# Patient Record
Sex: MALE | Race: WHITE | Employment: FULL TIME | URBAN - NONMETROPOLITAN AREA
[De-identification: names, ages, dates, MRNs, and addresses within clinical notes are randomized per-mention and may not be internally consistent; named-entity substitution may affect disease eponyms.]

---

## 2020-07-01 ENCOUNTER — APPOINTMENT (OUTPATIENT)
Dept: GENERAL RADIOLOGY | Age: 25
End: 2020-07-01
Payer: MEDICAID

## 2020-07-01 ENCOUNTER — HOSPITAL ENCOUNTER (EMERGENCY)
Age: 25
Discharge: HOME | End: 2020-07-02
Attending: EMERGENCY MEDICINE
Payer: MEDICAID

## 2020-07-01 ENCOUNTER — APPOINTMENT (OUTPATIENT)
Dept: CT IMAGING | Age: 25
End: 2020-07-01
Payer: MEDICAID

## 2020-07-01 LAB
ALBUMIN SERPL-MCNC: 4.2 G/DL (ref 3.5–5.1)
ALP BLD-CCNC: 59 U/L (ref 38–126)
ALT SERPL-CCNC: 25 U/L (ref 11–66)
ANION GAP SERPL CALCULATED.3IONS-SCNC: 15 MEQ/L (ref 8–16)
AST SERPL-CCNC: 15 U/L (ref 5–40)
BASOPHILS # BLD: 0.3 %
BASOPHILS ABSOLUTE: 0 THOU/MM3 (ref 0–0.1)
BILIRUB SERPL-MCNC: 0.5 MG/DL (ref 0.3–1.2)
BUN BLDV-MCNC: 9 MG/DL (ref 7–22)
CALCIUM SERPL-MCNC: 9.2 MG/DL (ref 8.5–10.5)
CHLORIDE BLD-SCNC: 102 MEQ/L (ref 98–111)
CO2: 24 MEQ/L (ref 23–33)
CREAT SERPL-MCNC: 0.8 MG/DL (ref 0.4–1.2)
EKG ATRIAL RATE: 100 BPM
EKG P AXIS: 59 DEGREES
EKG P-R INTERVAL: 144 MS
EKG Q-T INTERVAL: 332 MS
EKG QRS DURATION: 80 MS
EKG QTC CALCULATION (BAZETT): 428 MS
EKG R AXIS: 46 DEGREES
EKG T AXIS: 41 DEGREES
EKG VENTRICULAR RATE: 100 BPM
EOSINOPHIL # BLD: 1.6 %
EOSINOPHILS ABSOLUTE: 0.2 THOU/MM3 (ref 0–0.4)
ERYTHROCYTE [DISTWIDTH] IN BLOOD BY AUTOMATED COUNT: 12.7 % (ref 11.5–14.5)
ERYTHROCYTE [DISTWIDTH] IN BLOOD BY AUTOMATED COUNT: 41.7 FL (ref 35–45)
GFR SERPL CREATININE-BSD FRML MDRD: > 90 ML/MIN/1.73M2
GLUCOSE BLD-MCNC: 98 MG/DL (ref 70–108)
HCT VFR BLD CALC: 48.1 % (ref 42–52)
HEMOGLOBIN: 15.8 GM/DL (ref 14–18)
IMMATURE GRANS (ABS): 0.03 THOU/MM3 (ref 0–0.07)
IMMATURE GRANULOCYTES: 0.3 %
LIPASE: 21 U/L (ref 5.6–51.3)
LYMPHOCYTES # BLD: 16.4 %
LYMPHOCYTES ABSOLUTE: 1.9 THOU/MM3 (ref 1–4.8)
MCH RBC QN AUTO: 29.5 PG (ref 26–33)
MCHC RBC AUTO-ENTMCNC: 32.8 GM/DL (ref 32.2–35.5)
MCV RBC AUTO: 89.7 FL (ref 80–94)
MONOCYTES # BLD: 8.4 %
MONOCYTES ABSOLUTE: 1 THOU/MM3 (ref 0.4–1.3)
NUCLEATED RED BLOOD CELLS: 0 /100 WBC
OSMOLALITY CALCULATION: 279.9 MOSMOL/KG (ref 275–300)
PLATELET # BLD: 351 THOU/MM3 (ref 130–400)
PMV BLD AUTO: 9 FL (ref 9.4–12.4)
POTASSIUM SERPL-SCNC: 4.1 MEQ/L (ref 3.5–5.2)
RBC # BLD: 5.36 MILL/MM3 (ref 4.7–6.1)
SEG NEUTROPHILS: 73 %
SEGMENTED NEUTROPHILS ABSOLUTE COUNT: 8.4 THOU/MM3 (ref 1.8–7.7)
SODIUM BLD-SCNC: 141 MEQ/L (ref 135–145)
TOTAL PROTEIN: 7.7 G/DL (ref 6.1–8)
WBC # BLD: 11.5 THOU/MM3 (ref 4.8–10.8)

## 2020-07-01 PROCEDURE — 96375 TX/PRO/DX INJ NEW DRUG ADDON: CPT

## 2020-07-01 PROCEDURE — 74018 RADEX ABDOMEN 1 VIEW: CPT

## 2020-07-01 PROCEDURE — 74177 CT ABD & PELVIS W/CONTRAST: CPT

## 2020-07-01 PROCEDURE — 99284 EMERGENCY DEPT VISIT MOD MDM: CPT

## 2020-07-01 PROCEDURE — 93005 ELECTROCARDIOGRAM TRACING: CPT | Performed by: EMERGENCY MEDICINE

## 2020-07-01 PROCEDURE — 6370000000 HC RX 637 (ALT 250 FOR IP): Performed by: EMERGENCY MEDICINE

## 2020-07-01 PROCEDURE — 6360000002 HC RX W HCPCS: Performed by: EMERGENCY MEDICINE

## 2020-07-01 PROCEDURE — 36415 COLL VENOUS BLD VENIPUNCTURE: CPT

## 2020-07-01 PROCEDURE — 85025 COMPLETE CBC W/AUTO DIFF WBC: CPT

## 2020-07-01 PROCEDURE — 96374 THER/PROPH/DIAG INJ IV PUSH: CPT

## 2020-07-01 PROCEDURE — 80053 COMPREHEN METABOLIC PANEL: CPT

## 2020-07-01 PROCEDURE — 6360000004 HC RX CONTRAST MEDICATION: Performed by: EMERGENCY MEDICINE

## 2020-07-01 PROCEDURE — 83690 ASSAY OF LIPASE: CPT

## 2020-07-01 PROCEDURE — 2580000003 HC RX 258: Performed by: EMERGENCY MEDICINE

## 2020-07-01 RX ORDER — 0.9 % SODIUM CHLORIDE 0.9 %
1000 INTRAVENOUS SOLUTION INTRAVENOUS ONCE
Status: COMPLETED | OUTPATIENT
Start: 2020-07-01 | End: 2020-07-02

## 2020-07-01 RX ORDER — METOCLOPRAMIDE HYDROCHLORIDE 5 MG/ML
10 INJECTION INTRAMUSCULAR; INTRAVENOUS ONCE
Status: COMPLETED | OUTPATIENT
Start: 2020-07-01 | End: 2020-07-01

## 2020-07-01 RX ORDER — DICYCLOMINE HYDROCHLORIDE 10 MG/1
20 CAPSULE ORAL ONCE
Status: COMPLETED | OUTPATIENT
Start: 2020-07-01 | End: 2020-07-01

## 2020-07-01 RX ORDER — KETOROLAC TROMETHAMINE 30 MG/ML
30 INJECTION, SOLUTION INTRAMUSCULAR; INTRAVENOUS ONCE
Status: COMPLETED | OUTPATIENT
Start: 2020-07-01 | End: 2020-07-01

## 2020-07-01 RX ADMIN — KETOROLAC TROMETHAMINE 30 MG: 30 INJECTION, SOLUTION INTRAMUSCULAR at 23:05

## 2020-07-01 RX ADMIN — SODIUM CHLORIDE 1000 ML: 9 INJECTION, SOLUTION INTRAVENOUS at 23:07

## 2020-07-01 RX ADMIN — DICYCLOMINE HYDROCHLORIDE 20 MG: 10 CAPSULE ORAL at 23:04

## 2020-07-01 RX ADMIN — METOCLOPRAMIDE 10 MG: 5 INJECTION, SOLUTION INTRAMUSCULAR; INTRAVENOUS at 23:05

## 2020-07-01 ASSESSMENT — PAIN SCALES - GENERAL: PAINLEVEL_OUTOF10: 10

## 2020-07-02 VITALS
WEIGHT: 300 LBS | HEART RATE: 64 BPM | DIASTOLIC BLOOD PRESSURE: 61 MMHG | SYSTOLIC BLOOD PRESSURE: 105 MMHG | BODY MASS INDEX: 42.95 KG/M2 | HEIGHT: 70 IN | OXYGEN SATURATION: 99 % | TEMPERATURE: 97.7 F | RESPIRATION RATE: 20 BRPM

## 2020-07-02 PROCEDURE — 6360000004 HC RX CONTRAST MEDICATION: Performed by: EMERGENCY MEDICINE

## 2020-07-02 PROCEDURE — 93010 ELECTROCARDIOGRAM REPORT: CPT | Performed by: NUCLEAR MEDICINE

## 2020-07-02 RX ORDER — DICYCLOMINE HYDROCHLORIDE 10 MG/1
10 CAPSULE ORAL EVERY 6 HOURS PRN
Qty: 9 CAPSULE | Refills: 0 | Status: SHIPPED | OUTPATIENT
Start: 2020-07-02 | End: 2020-07-17

## 2020-07-02 RX ADMIN — IOPAMIDOL 85 ML: 755 INJECTION, SOLUTION INTRAVENOUS at 00:19

## 2020-07-02 NOTE — ED NOTES
Pt updated on POC. Pt respirations easy and unlabored. Pt denies any further needs at this time.       Sarah Martínez RN  07/02/20 5651

## 2020-07-02 NOTE — ED PROVIDER NOTES
[facility] COMPLAINT       Chief Complaint   Patient presents with    Abdominal Pain       Nurses Notes reviewed and I agree except as noted in the HPI. HISTORY OF PRESENT ILLNESS    Asa Calles is a 27 y.o. male. He is developed abdominal pain waxing and waning over the past few days. It is more towards the right side. He has had some constipation. He tried a laxative. He had no  previous abdominal surgeries. No fever reported    REVIEW OF SYSTEMS         No Chest pain, no shortness of breath, no fever. Remainder of review of systems is otherwise reviewed as negative. PAST MEDICAL HISTORY    has no past medical history on file. SURGICAL HISTORY      has no past surgical history on file. CURRENT MEDICATIONS       Previous Medications    No medications on file       ALLERGIES     has No Known Allergies. FAMILY HISTORY     has no family status information on file. family history is not on file. SOCIAL HISTORY          PHYSICAL EXAM     INITIAL VITALS:  height is 5' 10\" (1.778 m) and weight is 300 lb (136.1 kg). His oral temperature is 97.7 °F (36.5 °C). His blood pressure is 105/61 and his pulse is 64. His respiration is 20 and oxygen saturation is 99%. Constitutional: Well appearing and non-toxic   Eyes:  Pupils are equal and reactive, extraocular muscles intact   HENT:  Atraumatic appearing  oropharynx moist, no pharyngeal exudates.   Neck- normal range of motion, supple   Respiratory:  No wheezing, rhonchi or rales  Cardiovascular: regular, no murmur  GI:   no rigidity, rebound or guarding  :  No costovertebral angle tenderness   Musculoskeletal:  2/4 distal pulses, no pitting edema  Integument: warm and dry  Neurologic:  Alert & oriented x 3  Psychiatric:  Speech and behavior appropriate       DIAGNOSTIC RESULTS     EKG  interpreted by me showing sinus rhythm at a rate of 100, QRS of 80, QTc of 428, axis of 46.  No acute ischemic changes. RADIOLOGY: non-plain film images(s) such as CT, Ultrasound and MRI are read by the radiologist.  CT of the abdomen and pelvis interpreted by the radiologist.  he has evidence of biliary disease. LABS:   Labs Reviewed   CBC WITH AUTO DIFFERENTIAL - Abnormal; Notable for the following components:       Result Value    WBC 11.5 (*)     MPV 9.0 (*)     Segs Absolute 8.4 (*)     All other components within normal limits   COMPREHENSIVE METABOLIC PANEL   LIPASE   ANION GAP   GLOMERULAR FILTRATION RATE, ESTIMATED   OSMOLALITY       EMERGENCY DEPARTMENT COURSE:   Vitals:    Vitals:    07/01/20 2241 07/01/20 2308 07/02/20 0008 07/02/20 0108   BP: 133/87 127/73 106/66 105/61   Pulse: 102 100 83 64   Resp: 20 20 20 20   Temp: 97.7 °F (36.5 °C)      TempSrc: Oral      SpO2: 95% 95% 95% 99%   Weight: 300 lb (136.1 kg)      Height: 5' 10\" (1.778 m)        He received Bentyl Toradol and Reglan and is feeling considerably better. He never required narcotics. His pain seems to be almost essentially gone. He has no tenderness in his abdomen now. His CT scan does show evidence of cholelithiasis and is a question of possible questionable cholecystitis however there is no elevation of his liver transaminases or bilirubin's. White count is 11.5. He basically says that he is feeling better. These findings were discussed with the patient. I discussed that we could potentially admission to the hospital, but he feels comfortable going home at this point. We will provide him with a referral to general surgery he is advised to call for an appointment as soon as possible.   If his symptoms do become more persistent and are worsening over the next 2 to 3 days he should return      CRITICAL CARE:   none        FINAL IMPRESSION     Biliary Colic    DISPOSITION/PLAN   Discharged    DISCHARGE MEDICATIONS:  New Prescriptions    No medications on file       (Please note that portions of this note were completed with a voice recognition program.  Efforts were made to edit the dictations but occasionally words are mis-transcribed.)    Sharmin Onofre, [address] [place] [address] Keny,   07/02/20 0126

## 2020-07-14 ENCOUNTER — TELEPHONE (OUTPATIENT)
Dept: SURGERY | Age: 25
End: 2020-07-14

## 2020-07-14 ENCOUNTER — OFFICE VISIT (OUTPATIENT)
Dept: SURGERY | Age: 25
End: 2020-07-14
Payer: MEDICAID

## 2020-07-14 ENCOUNTER — PREP FOR PROCEDURE (OUTPATIENT)
Dept: SURGERY | Age: 25
End: 2020-07-14

## 2020-07-14 VITALS
WEIGHT: 309 LBS | HEIGHT: 70 IN | TEMPERATURE: 98.2 F | BODY MASS INDEX: 44.24 KG/M2 | HEART RATE: 68 BPM | SYSTOLIC BLOOD PRESSURE: 126 MMHG | OXYGEN SATURATION: 98 % | RESPIRATION RATE: 16 BRPM | DIASTOLIC BLOOD PRESSURE: 84 MMHG

## 2020-07-14 PROCEDURE — 99203 OFFICE O/P NEW LOW 30 MIN: CPT | Performed by: SURGERY

## 2020-07-14 RX ORDER — SODIUM CHLORIDE 9 MG/ML
INJECTION, SOLUTION INTRAVENOUS CONTINUOUS
Status: CANCELLED | OUTPATIENT
Start: 2020-07-14

## 2020-07-14 ASSESSMENT — ENCOUNTER SYMPTOMS
SINUS PRESSURE: 0
CHEST TIGHTNESS: 0
TROUBLE SWALLOWING: 0
VOICE CHANGE: 0
WHEEZING: 0
STRIDOR: 0
COLOR CHANGE: 0
SINUS PAIN: 0
CHOKING: 0
RHINORRHEA: 0
EYE DISCHARGE: 0
APNEA: 0
EYE ITCHING: 0
COUGH: 0
SHORTNESS OF BREATH: 0
SORE THROAT: 0
EYE REDNESS: 0
FACIAL SWELLING: 0
BACK PAIN: 0
PHOTOPHOBIA: 0
EYE PAIN: 0
ABDOMINAL PAIN: 1

## 2020-07-14 NOTE — PROGRESS NOTES
[address] [address] [address] [facility] [address]  Dept: [phone]  Dept Fax: [phone]  Loc: [phone]    Visit Date: 7/14/2020    Francis Maki is a 27 y.o. male who presentstoday for:  Chief Complaint   Patient presents with    Surgical Consult     new patient- [facility] ED 7/1/20-- gallstones       HPI:     HPI 21-year-old white male otherwise healthy who at the very end of June for 2 or 3 days had onset of epigastric and right upper quadrant pain initially thought this might be an ulcer issue he used some antiacid medications it did not help then he felt he may be constipated had laxatives with bowel movements but still did not help and because of the pain presented to the emergency room on July 2 work-up with a CT scan revealed cholelithiasis evidence of possible mild acute cholecystitis however he began feeling better and was discharged home with follow-up here in the office patient is continuing to have some discomfort he does have a family history of gallbladder disease    No past medical history on file. No past surgical history on file. No family history on file. Social History     Tobacco Use    Smoking status: Not on file   Substance Use Topics    Alcohol use: Not on file          Current Outpatient Medications   Medication Sig Dispense Refill    dicyclomine (BENTYL) 10 MG capsule Take 1 capsule by mouth every 6 hours as needed (cramps) 9 capsule 0     No current facility-administered medications for this visit. No Known Allergies    Subjective:      Review of Systems   Constitutional: Negative for activity change, appetite change, chills, diaphoresis, fatigue, fever and unexpected weight change.    HENT: Negative for congestion, dental problem, drooling, ear discharge, ear pain, facial swelling, hearing loss, mouth sores, nosebleeds, postnasal drip, rhinorrhea, sinus pressure, sinus pain, sneezing, sore throat, tinnitus, trouble swallowing and voice change. Eyes: Negative for photophobia, pain, discharge, redness, itching and visual disturbance. Respiratory: Negative for apnea, cough, choking, chest tightness, shortness of breath, wheezing and stridor. Cardiovascular: Negative for chest pain, palpitations and leg swelling. Gastrointestinal: Positive for abdominal pain. Endocrine: Negative for cold intolerance, heat intolerance, polydipsia, polyphagia and polyuria. Genitourinary: Negative for decreased urine volume, difficulty urinating, discharge, dysuria, enuresis, flank pain, frequency, genital sores, hematuria, penile pain, penile swelling, scrotal swelling, testicular pain and urgency. Musculoskeletal: Negative for arthralgias, back pain, gait problem, joint swelling, myalgias, neck pain and neck stiffness. Skin: Negative for color change, pallor, rash and wound. Allergic/Immunologic: Negative for environmental allergies, food allergies and immunocompromised state. Neurological: Negative for dizziness, tremors, seizures, syncope, facial asymmetry, speech difficulty, weakness, light-headedness, numbness and headaches. Hematological: Negative for adenopathy. Does not bruise/bleed easily. Psychiatric/Behavioral: Negative for agitation, behavioral problems, confusion, decreased concentration, dysphoric mood, hallucinations, self-injury, sleep disturbance and suicidal ideas. The patient is not nervous/anxious and is not hyperactive. Objective: There were no vitals taken for this visit. Physical Exam  Constitutional:       Appearance: He is well-developed. HENT:      Head: Normocephalic and atraumatic. Eyes:      General: No scleral icterus. Left eye: No discharge. Conjunctiva/sclera: Conjunctivae normal.      Pupils: Pupils are equal, round, and reactive to light. Neck:      Thyroid: No thyromegaly. Trachea: No tracheal deviation.    Cardiovascular:      Rate and Rhythm: Normal rate and regular rhythm. Heart sounds: Normal heart sounds. No murmur. No friction rub. No gallop. Pulmonary:      Effort: Pulmonary effort is normal. No respiratory distress. Breath sounds: Normal breath sounds. No wheezing or rales. Chest:      Chest wall: No tenderness. Abdominal:      Tenderness: There is abdominal tenderness. Musculoskeletal: Normal range of motion. General: No tenderness. Lymphadenopathy:      Cervical: No cervical adenopathy. Skin:     General: Skin is warm and dry. Coloration: Skin is not pale. Findings: No erythema or rash. Neurological:      Mental Status: He is alert and oriented to person, place, and time. Psychiatric:         Behavior: Behavior normal.         Thought Content:  Thought content normal.         Judgment: Judgment normal.            Results for orders placed or performed during the hospital encounter of 07/01/20   CBC auto differential   Result Value Ref Range    WBC 11.5 (H) 4.8 - 10.8 thou/mm3    RBC 5.36 4.70 - 6.10 mill/mm3    Hemoglobin 15.8 14.0 - 18.0 gm/dl    Hematocrit 48.1 42.0 - 52.0 %    MCV 89.7 80.0 - 94.0 fL    MCH 29.5 26.0 - 33.0 pg    MCHC 32.8 32.2 - 35.5 gm/dl    RDW-CV 12.7 11.5 - 14.5 %    RDW-SD 41.7 35.0 - 45.0 fL    Platelets 975 904 - 142 thou/mm3    MPV 9.0 (L) 9.4 - 12.4 fL    Seg Neutrophils 73.0 %    Lymphocytes 16.4 %    Monocytes 8.4 %    Eosinophils 1.6 %    Basophils 0.3 %    Immature Granulocytes 0.3 %    Segs Absolute 8.4 (H) 1.8 - 7.7 thou/mm3    Lymphocytes Absolute 1.9 1.0 - 4.8 thou/mm3    Monocytes Absolute 1.0 0.4 - 1.3 thou/mm3    Eosinophils Absolute 0.2 0.0 - 0.4 thou/mm3    Basophils Absolute 0.0 0.0 - 0.1 thou/mm3    Immature Grans (Abs) 0.03 0.00 - 0.07 thou/mm3    nRBC 0 /100 wbc   Comprehensive Metabolic Panel   Result Value Ref Range    Glucose 98 70 - 108 mg/dL    CREATININE 0.8 0.4 - 1.2 mg/dL    BUN 9 7 - 22 mg/dL    Sodium 141 135 - 145 meq/L    Potassium 4.1 3.5 - 5.2 meq/L    Chloride 102 98 - 111 meq/L    CO2 24 23 - 33 meq/L    Calcium 9.2 8.5 - 10.5 mg/dL    AST 15 5 - 40 U/L    Alkaline Phosphatase 59 38 - 126 U/L    Total Protein 7.7 6.1 - 8.0 g/dL    Alb 4.2 3.5 - 5.1 g/dL    Total Bilirubin 0.5 0.3 - 1.2 mg/dL    ALT 25 11 - 66 U/L   Lipase   Result Value Ref Range    Lipase 21.0 5.6 - 51.3 U/L   Anion Gap   Result Value Ref Range    Anion Gap 15.0 8.0 - 16.0 meq/L   Glomerular Filtration Rate, Estimated   Result Value Ref Range    Est, Glom Filt Rate >90 ml/min/1.73m2   Osmolality   Result Value Ref Range    Osmolality Calc 279.9 275.0 - 300 mOsmol/kg   EKG 12 Lead   Result Value Ref Range    Ventricular Rate 100 BPM    Atrial Rate 100 BPM    P-R Interval 144 ms    QRS Duration 80 ms    Q-T Interval 332 ms    QTc Calculation (Bazett) 428 ms    P Axis 59 degrees    R Axis 46 degrees    T Axis 41 degrees       Assessment:     Classic biliary colic symptoms with a CT scan recently showing cholelithiasis and evidence of likely acute cholecystitis we discussed gallbladder disease with the patient we discussed the laparoscopic procedure for removing the gallbladder and the potential need for open due to bleeding or bile duct injury it was recommended to the patient he consider pursuing laparoscopic cholecystectomy he would like to proceed    Plan:     1. Schedule Warren for laparoscopic cholecystectomy possible open  2. The risks, benefits and alternatives were discussed with Valeriano Gore. He understands and wishes to proceed with surgical intervention. 3. Restrictions discussed with Valeriano Gore and he expresses understanding. 4. He is advised to call back directly if there are further questions/concerns, or if his symptoms worsen prior to surgery.             Electronicallysigned by Lidia Perez MD on 7/14/2020 at 11:32 AM

## 2020-07-14 NOTE — TELEPHONE ENCOUNTER
Scheduled Retia Adrian for a lap hakeem on Mon 7/27 & he will arrive to the hospital at 7:45. He will also be npo after midnight, consent was signed & antibacterial soap was given. Covid testing will be done 5-7 days prior to surgery.

## 2020-07-17 ENCOUNTER — APPOINTMENT (OUTPATIENT)
Dept: GENERAL RADIOLOGY | Age: 25
DRG: 263 | End: 2020-07-17
Payer: MEDICAID

## 2020-07-17 ENCOUNTER — HOSPITAL ENCOUNTER (INPATIENT)
Age: 25
LOS: 3 days | Discharge: HOME | DRG: 263 | End: 2020-07-20
Attending: SURGERY | Admitting: SURGERY
Payer: MEDICAID

## 2020-07-17 ENCOUNTER — APPOINTMENT (OUTPATIENT)
Dept: MRI IMAGING | Age: 25
DRG: 263 | End: 2020-07-17
Payer: MEDICAID

## 2020-07-17 PROBLEM — K85.10 GALLSTONE PANCREATITIS: Status: ACTIVE | Noted: 2020-07-17

## 2020-07-17 LAB
ALBUMIN SERPL-MCNC: 4.3 G/DL (ref 3.5–5.1)
ALBUMIN SERPL-MCNC: 4.4 G/DL (ref 3.5–5.1)
ALP BLD-CCNC: 136 U/L (ref 38–126)
ALP BLD-CCNC: 148 U/L (ref 38–126)
ALT SERPL-CCNC: 385 U/L (ref 11–66)
ALT SERPL-CCNC: 397 U/L (ref 11–66)
ANION GAP SERPL CALCULATED.3IONS-SCNC: 14 MEQ/L (ref 8–16)
AST SERPL-CCNC: 99 U/L (ref 5–40)
AST SERPL-CCNC: 99 U/L (ref 5–40)
BASOPHILS # BLD: 0.3 %
BASOPHILS ABSOLUTE: 0 THOU/MM3 (ref 0–0.1)
BILIRUB SERPL-MCNC: 6 MG/DL (ref 0.3–1.2)
BILIRUB SERPL-MCNC: 6.1 MG/DL (ref 0.3–1.2)
BILIRUBIN DIRECT: 4.9 MG/DL (ref 0–0.3)
BUN BLDV-MCNC: 9 MG/DL (ref 7–22)
CALCIUM SERPL-MCNC: 10.3 MG/DL (ref 8.5–10.5)
CHLORIDE BLD-SCNC: 100 MEQ/L (ref 98–111)
CO2: 25 MEQ/L (ref 23–33)
CREAT SERPL-MCNC: 0.9 MG/DL (ref 0.4–1.2)
EKG ATRIAL RATE: 97 BPM
EKG P AXIS: 59 DEGREES
EKG P-R INTERVAL: 152 MS
EKG Q-T INTERVAL: 356 MS
EKG QRS DURATION: 98 MS
EKG QTC CALCULATION (BAZETT): 452 MS
EKG R AXIS: 39 DEGREES
EKG T AXIS: 9 DEGREES
EKG VENTRICULAR RATE: 97 BPM
EOSINOPHIL # BLD: 3.1 %
EOSINOPHILS ABSOLUTE: 0.3 THOU/MM3 (ref 0–0.4)
ERYTHROCYTE [DISTWIDTH] IN BLOOD BY AUTOMATED COUNT: 13.4 % (ref 11.5–14.5)
ERYTHROCYTE [DISTWIDTH] IN BLOOD BY AUTOMATED COUNT: 43.9 FL (ref 35–45)
GFR SERPL CREATININE-BSD FRML MDRD: > 90 ML/MIN/1.73M2
GLUCOSE BLD-MCNC: 172 MG/DL (ref 70–108)
HCT VFR BLD CALC: 51.8 % (ref 42–52)
HEMOGLOBIN: 16.9 GM/DL (ref 14–18)
IMMATURE GRANS (ABS): 0.02 THOU/MM3 (ref 0–0.07)
IMMATURE GRANULOCYTES: 0.2 %
LIPASE: > 3000 U/L (ref 5.6–51.3)
LYMPHOCYTES # BLD: 27.4 %
LYMPHOCYTES ABSOLUTE: 2.5 THOU/MM3 (ref 1–4.8)
MAGNESIUM: 2.2 MG/DL (ref 1.6–2.4)
MCH RBC QN AUTO: 29.5 PG (ref 26–33)
MCHC RBC AUTO-ENTMCNC: 32.6 GM/DL (ref 32.2–35.5)
MCV RBC AUTO: 90.6 FL (ref 80–94)
MONOCYTES # BLD: 8.1 %
MONOCYTES ABSOLUTE: 0.7 THOU/MM3 (ref 0.4–1.3)
NUCLEATED RED BLOOD CELLS: 0 /100 WBC
OSMOLALITY CALCULATION: 280.3 MOSMOL/KG (ref 275–300)
PLATELET # BLD: 396 THOU/MM3 (ref 130–400)
PMV BLD AUTO: 9.2 FL (ref 9.4–12.4)
POTASSIUM REFLEX MAGNESIUM: 3.7 MEQ/L (ref 3.5–5.2)
RBC # BLD: 5.72 MILL/MM3 (ref 4.7–6.1)
SEG NEUTROPHILS: 60.9 %
SEGMENTED NEUTROPHILS ABSOLUTE COUNT: 5.5 THOU/MM3 (ref 1.8–7.7)
SODIUM BLD-SCNC: 139 MEQ/L (ref 135–145)
TOTAL PROTEIN: 7.9 G/DL (ref 6.1–8)
TOTAL PROTEIN: 8.2 G/DL (ref 6.1–8)
TROPONIN T: < 0.01 NG/ML
WBC # BLD: 9.1 THOU/MM3 (ref 4.8–10.8)

## 2020-07-17 PROCEDURE — 96374 THER/PROPH/DIAG INJ IV PUSH: CPT

## 2020-07-17 PROCEDURE — 6360000002 HC RX W HCPCS: Performed by: PHYSICIAN ASSISTANT

## 2020-07-17 PROCEDURE — 80053 COMPREHEN METABOLIC PANEL: CPT

## 2020-07-17 PROCEDURE — 96375 TX/PRO/DX INJ NEW DRUG ADDON: CPT

## 2020-07-17 PROCEDURE — 6360000004 HC RX CONTRAST MEDICATION: Performed by: PHYSICIAN ASSISTANT

## 2020-07-17 PROCEDURE — A9579 GAD-BASE MR CONTRAST NOS,1ML: HCPCS | Performed by: PHYSICIAN ASSISTANT

## 2020-07-17 PROCEDURE — 1200000000 HC SEMI PRIVATE

## 2020-07-17 PROCEDURE — 74018 RADEX ABDOMEN 1 VIEW: CPT

## 2020-07-17 PROCEDURE — 94760 N-INVAS EAR/PLS OXIMETRY 1: CPT

## 2020-07-17 PROCEDURE — 83735 ASSAY OF MAGNESIUM: CPT

## 2020-07-17 PROCEDURE — 2580000003 HC RX 258: Performed by: SURGERY

## 2020-07-17 PROCEDURE — 99284 EMERGENCY DEPT VISIT MOD MDM: CPT

## 2020-07-17 PROCEDURE — 74183 MRI ABD W/O CNTR FLWD CNTR: CPT

## 2020-07-17 PROCEDURE — 36415 COLL VENOUS BLD VENIPUNCTURE: CPT

## 2020-07-17 PROCEDURE — 2580000003 HC RX 258: Performed by: PHYSICIAN ASSISTANT

## 2020-07-17 PROCEDURE — 93005 ELECTROCARDIOGRAM TRACING: CPT | Performed by: PHYSICIAN ASSISTANT

## 2020-07-17 PROCEDURE — 84484 ASSAY OF TROPONIN QUANT: CPT

## 2020-07-17 PROCEDURE — 6360000002 HC RX W HCPCS: Performed by: SURGERY

## 2020-07-17 PROCEDURE — 93010 ELECTROCARDIOGRAM REPORT: CPT | Performed by: NUCLEAR MEDICINE

## 2020-07-17 PROCEDURE — C9113 INJ PANTOPRAZOLE SODIUM, VIA: HCPCS | Performed by: PHYSICIAN ASSISTANT

## 2020-07-17 PROCEDURE — 85025 COMPLETE CBC W/AUTO DIFF WBC: CPT

## 2020-07-17 PROCEDURE — 83690 ASSAY OF LIPASE: CPT

## 2020-07-17 PROCEDURE — 99222 1ST HOSP IP/OBS MODERATE 55: CPT | Performed by: SURGERY

## 2020-07-17 RX ORDER — 0.9 % SODIUM CHLORIDE 0.9 %
1000 INTRAVENOUS SOLUTION INTRAVENOUS ONCE
Status: COMPLETED | OUTPATIENT
Start: 2020-07-17 | End: 2020-07-17

## 2020-07-17 RX ORDER — PANTOPRAZOLE SODIUM 40 MG/10ML
40 INJECTION, POWDER, LYOPHILIZED, FOR SOLUTION INTRAVENOUS ONCE
Status: COMPLETED | OUTPATIENT
Start: 2020-07-17 | End: 2020-07-17

## 2020-07-17 RX ORDER — ONDANSETRON 2 MG/ML
4 INJECTION INTRAMUSCULAR; INTRAVENOUS EVERY 6 HOURS PRN
Status: DISCONTINUED | OUTPATIENT
Start: 2020-07-17 | End: 2020-07-20 | Stop reason: HOSPADM

## 2020-07-17 RX ORDER — SODIUM CHLORIDE 9 MG/ML
INJECTION, SOLUTION INTRAVENOUS CONTINUOUS
Status: DISCONTINUED | OUTPATIENT
Start: 2020-07-17 | End: 2020-07-20

## 2020-07-17 RX ORDER — SODIUM CHLORIDE 0.9 % (FLUSH) 0.9 %
10 SYRINGE (ML) INJECTION EVERY 12 HOURS SCHEDULED
Status: DISCONTINUED | OUTPATIENT
Start: 2020-07-17 | End: 2020-07-20 | Stop reason: HOSPADM

## 2020-07-17 RX ORDER — MORPHINE SULFATE 4 MG/ML
4 INJECTION, SOLUTION INTRAMUSCULAR; INTRAVENOUS ONCE
Status: COMPLETED | OUTPATIENT
Start: 2020-07-17 | End: 2020-07-17

## 2020-07-17 RX ORDER — ONDANSETRON 2 MG/ML
4 INJECTION INTRAMUSCULAR; INTRAVENOUS ONCE
Status: COMPLETED | OUTPATIENT
Start: 2020-07-17 | End: 2020-07-17

## 2020-07-17 RX ORDER — SODIUM CHLORIDE 0.9 % (FLUSH) 0.9 %
10 SYRINGE (ML) INJECTION PRN
Status: DISCONTINUED | OUTPATIENT
Start: 2020-07-17 | End: 2020-07-20 | Stop reason: HOSPADM

## 2020-07-17 RX ADMIN — SODIUM CHLORIDE 1000 ML: 9 INJECTION, SOLUTION INTRAVENOUS at 07:09

## 2020-07-17 RX ADMIN — MORPHINE SULFATE 4 MG: 4 INJECTION, SOLUTION INTRAMUSCULAR; INTRAVENOUS at 07:09

## 2020-07-17 RX ADMIN — SODIUM CHLORIDE: 9 INJECTION, SOLUTION INTRAVENOUS at 18:06

## 2020-07-17 RX ADMIN — SODIUM CHLORIDE: 9 INJECTION, SOLUTION INTRAVENOUS at 10:57

## 2020-07-17 RX ADMIN — PANTOPRAZOLE SODIUM 40 MG: 40 INJECTION, POWDER, FOR SOLUTION INTRAVENOUS at 07:09

## 2020-07-17 RX ADMIN — ONDANSETRON 4 MG: 2 INJECTION INTRAMUSCULAR; INTRAVENOUS at 07:09

## 2020-07-17 RX ADMIN — HYDROMORPHONE HYDROCHLORIDE 0.25 MG: 1 INJECTION, SOLUTION INTRAMUSCULAR; INTRAVENOUS; SUBCUTANEOUS at 18:03

## 2020-07-17 RX ADMIN — ONDANSETRON 4 MG: 2 INJECTION INTRAMUSCULAR; INTRAVENOUS at 11:00

## 2020-07-17 RX ADMIN — GADOTERIDOL 20 ML: 279.3 INJECTION, SOLUTION INTRAVENOUS at 09:42

## 2020-07-17 RX ADMIN — HYDROMORPHONE HYDROCHLORIDE 0.5 MG: 1 INJECTION, SOLUTION INTRAMUSCULAR; INTRAVENOUS; SUBCUTANEOUS at 10:52

## 2020-07-17 RX ADMIN — Medication 10 ML: at 10:57

## 2020-07-17 RX ADMIN — PIPERACILLIN AND TAZOBACTAM 3.38 G: 3; .375 INJECTION, POWDER, FOR SOLUTION INTRAVENOUS at 18:00

## 2020-07-17 RX ADMIN — PIPERACILLIN AND TAZOBACTAM 3.38 G: 3; .375 INJECTION, POWDER, FOR SOLUTION INTRAVENOUS at 11:02

## 2020-07-17 RX ADMIN — HYDROMORPHONE HYDROCHLORIDE 0.5 MG: 1 INJECTION, SOLUTION INTRAMUSCULAR; INTRAVENOUS; SUBCUTANEOUS at 14:33

## 2020-07-17 ASSESSMENT — ENCOUNTER SYMPTOMS
COUGH: 0
RHINORRHEA: 0
NAUSEA: 1
WHEEZING: 0
DIARRHEA: 0
EYE PAIN: 0
CHEST TIGHTNESS: 0
VOMITING: 1
EYE DISCHARGE: 0
ABDOMINAL PAIN: 1
BACK PAIN: 0

## 2020-07-17 ASSESSMENT — PAIN DESCRIPTION - FREQUENCY
FREQUENCY: CONTINUOUS
FREQUENCY: CONTINUOUS

## 2020-07-17 ASSESSMENT — PAIN SCALES - GENERAL
PAINLEVEL_OUTOF10: 5
PAINLEVEL_OUTOF10: 8
PAINLEVEL_OUTOF10: 5
PAINLEVEL_OUTOF10: 8
PAINLEVEL_OUTOF10: 4
PAINLEVEL_OUTOF10: 6

## 2020-07-17 ASSESSMENT — PAIN DESCRIPTION - DESCRIPTORS
DESCRIPTORS: SHARP
DESCRIPTORS: SHARP

## 2020-07-17 ASSESSMENT — PAIN DESCRIPTION - PAIN TYPE
TYPE: ACUTE PAIN
TYPE: ACUTE PAIN

## 2020-07-17 ASSESSMENT — PAIN DESCRIPTION - LOCATION
LOCATION: ABDOMEN
LOCATION: ABDOMEN

## 2020-07-17 ASSESSMENT — PAIN DESCRIPTION - PROGRESSION: CLINICAL_PROGRESSION: NOT CHANGED

## 2020-07-17 ASSESSMENT — PAIN DESCRIPTION - ORIENTATION
ORIENTATION: RIGHT;UPPER
ORIENTATION: RIGHT;UPPER;MID

## 2020-07-17 ASSESSMENT — PAIN DESCRIPTION - ONSET: ONSET: ON-GOING

## 2020-07-17 ASSESSMENT — PAIN - FUNCTIONAL ASSESSMENT: PAIN_FUNCTIONAL_ASSESSMENT: PREVENTS OR INTERFERES SOME ACTIVE ACTIVITIES AND ADLS

## 2020-07-17 NOTE — ED NOTES
Dr. Lenore Fulton at bedside. Pt updated on POC for MRI and reason for admission. Pt agreeable and denies other questions at this time. VSS. Call light in reach. Will monitor.       Wero Peralta RN  07/17/20 5355

## 2020-07-17 NOTE — ED PROVIDER NOTES
[facility] COMPLAINT       Chief Complaint   Patient presents with    Abdominal Pain       Nurses Notes reviewed and I agree except as notedin the HPI. HISTORY OF PRESENT ILLNESS    Coreen Patrick is a 27 y.o. male who presents complains of right upper quadrant pain that started at 5 AM.  The patient states he has had nausea and vomiting spells and dry heaves. He describes the pain is constant sharp 8 out of 10. The patient states that he was diagnosed with cholelithiasis on July 1. He was seen by Dr. Elly Cagle and has surgery scheduled for the 27th. He is not had any fever or chills. Location/Symptom: RUQ pain  Timing/Onset: 0500 am  Context/Setting: home  Quality: sharp  Duration: constant  Modifying Factors: none  Severity: 8    REVIEW OF SYSTEMS     Review of Systems   Constitutional: Negative for chills, fatigue and fever. HENT: Negative for congestion, ear pain and rhinorrhea. Eyes: Negative for pain and discharge. Respiratory: Negative for cough, chest tightness and wheezing. Cardiovascular: Negative for chest pain, palpitations and leg swelling. Gastrointestinal: Positive for abdominal pain, nausea and vomiting. Negative for diarrhea. Genitourinary: Negative for dysuria and frequency. Musculoskeletal: Negative for arthralgias, back pain, myalgias and neck pain. Skin: Negative for rash. Neurological: Negative for dizziness, weakness and headaches. All other systems reviewed and are negative. PAST MEDICAL HISTORY    has no past medical history on file. SURGICAL HISTORY      has no past surgical history on file. CURRENT MEDICATIONS       Previous Medications    No medications on file       ALLERGIES     has No Known Allergies. HISTORY     has no family status information on file. family history is not on file. SOCIALHISTORY      reports that he has never smoked.  He has never used smokeless tobacco. He reports current alcohol use. He reports that he does not use drugs. PHYSICAL EXAM     INITIAL VITALS:  height is 5' 10\" (1.778 m) and weight is 300 lb (136.1 kg). His oral temperature is 97.6 °F (36.4 °C). His blood pressure is 117/74 and his pulse is 86. His respiration is 18 and oxygen saturation is 96%. Physical Exam  Vitals signs and nursing note reviewed. Constitutional:       Appearance: He is well-developed. HENT:      Head: Normocephalic and atraumatic. Right Ear: External ear normal.      Left Ear: External ear normal.   Eyes:      Pupils: Pupils are equal, round, and reactive to light. Neck:      Musculoskeletal: Normal range of motion. Cardiovascular:      Rate and Rhythm: Normal rate and regular rhythm. Pulmonary:      Effort: Pulmonary effort is normal.      Breath sounds: Normal breath sounds. No wheezing. Abdominal:      General: Bowel sounds are normal. There is no distension. Palpations: Abdomen is soft. Tenderness: There is abdominal tenderness. Comments: Right upper quadrant tenderness. Skin:     General: Skin is warm. Neurological:      Mental Status: He is alert and oriented to person, place, and time. Cranial Nerves: No cranial nerve deficit. Coordination: Coordination normal.      Deep Tendon Reflexes: Reflexes normal.   Psychiatric:         Behavior: Behavior normal.         DIFFERENTIAL DIAGNOSIS:   Right upper quadrant tenderness, biliary colic, pancreatitis gastritis. DIAGNOSTIC RESULTS     EKG: All EKG's are interpreted by the Emergency Department Physician who either signs or Co-signs this chart in the absence of a cardiologist.      RADIOLOGY: non-plain film images(s) such as CT, Ultrasound and MRI are read by the radiologist.  Dao Le read per radiology   XR ABDOMEN (KUB) (SINGLE AP VIEW) (Final result)   Result time 07/17/20 07:44:36   Final result by Skye Nguyễn.  Mary Giordano MD (07/17/20 07:44:36)                 Impression: Nonobstructive bowel gas pattern. Final report electronically signed by Dr. York Heading on 7/17/2020 7:44 AM             Narrative:     PROCEDURE: XR ABDOMEN (KUB) (SINGLE AP VIEW)     CLINICAL INFORMATION: Right upper quadrant abdominal pain     TECHNIQUE: 2 AP supine abdominal radiographs     COMPARISON: Abdomen 7/1/2020     FINDINGS: Bowel gas pattern is nonobstructive. No abnormal masses or calcifications are seen. Osseous structures are unremarkable. LABS:   Labs Reviewed   CBC WITH AUTO DIFFERENTIAL - Abnormal; Notable for the following components:       Result Value    MPV 9.2 (*)     All other components within normal limits   COMPREHENSIVE METABOLIC PANEL W/ REFLEX TO MG FOR LOW K - Abnormal; Notable for the following components:    Glucose 172 (*)     AST 99 (*)     Alkaline Phosphatase 136 (*)     Total Protein 8.2 (*)     Total Bilirubin 6.0 (*)      (*)     All other components within normal limits   LIPASE - Abnormal; Notable for the following components:    Lipase >3,000.0 (*)     All other components within normal limits   HEPATIC FUNCTION PANEL - Abnormal; Notable for the following components: Total Bilirubin 6.1 (*)     Bilirubin, Direct 4.9 (*)     Alkaline Phosphatase 148 (*)     AST 99 (*)      (*)     All other components within normal limits   TROPONIN   MAGNESIUM   ANION GAP   GLOMERULAR FILTRATION RATE, ESTIMATED   OSMOLALITY       EMERGENCY DEPARTMENT COURSE:   :    Vitals:    07/17/20 0647 07/17/20 0808   BP: 115/81 117/74   Pulse: 110 86   Resp: 18 18   Temp: 97.6 °F (36.4 °C)    TempSrc: Oral    SpO2: 99% 96%   Weight: 300 lb (136.1 kg)    Height: 5' 10\" (1.778 m)      Patient was seen history physical exam was performed. patient was given Protonix, Zofran, and morphine. . Patient does have gallstone pancreatitis as well as elevated bilirubin.   I discussed the case with Dr. Po Brown who is going admit the patient for further care management. He wanted get an MRCP. See disposition below    CRITICAL CARE:  None    CONSULTS:  Dr. Griselda Felder:  None    FINAL IMPRESSION      1. Abdominal pain, unspecified abdominal location    2. Gallstone pancreatitis    3. Elevated transaminase level          DISPOSITION/PLAN   Admit    PATIENT REFERRED TO:  No follow-up provider specified.     DISCHARGE MEDICATIONS:  New Prescriptions    No medications on file       (Please note that portions of this note were completed with a voice recognitionprogram.  Efforts were made to edit the dictations but occasionally words are mis-transcribed.)    Gladis Latif, [address] [place] [address]  07/17/20 1796

## 2020-07-17 NOTE — ED NOTES
Pt resting in bed with call light in reach. Denies needs at this time. Rates pain 4/10 and states that he is feeling a little better since the pain medication. VSS. Will monitor.       Ivania Zelaya RN  07/17/20 3334

## 2020-07-18 LAB
ALBUMIN SERPL-MCNC: 3.6 G/DL (ref 3.5–5.1)
ALP BLD-CCNC: 119 U/L (ref 38–126)
ALT SERPL-CCNC: 259 U/L (ref 11–66)
AMYLASE: 629 U/L (ref 20–104)
AST SERPL-CCNC: 60 U/L (ref 5–40)
BASOPHILS # BLD: 0.1 %
BASOPHILS ABSOLUTE: 0 THOU/MM3 (ref 0–0.1)
BILIRUB SERPL-MCNC: 2.3 MG/DL (ref 0.3–1.2)
BILIRUBIN DIRECT: 1.2 MG/DL (ref 0–0.3)
EOSINOPHIL # BLD: 0.1 %
EOSINOPHILS ABSOLUTE: 0 THOU/MM3 (ref 0–0.4)
ERYTHROCYTE [DISTWIDTH] IN BLOOD BY AUTOMATED COUNT: 14 % (ref 11.5–14.5)
ERYTHROCYTE [DISTWIDTH] IN BLOOD BY AUTOMATED COUNT: 47.6 FL (ref 35–45)
HCT VFR BLD CALC: 47.5 % (ref 42–52)
HEMOGLOBIN: 15.4 GM/DL (ref 14–18)
IMMATURE GRANS (ABS): 0.07 THOU/MM3 (ref 0–0.07)
IMMATURE GRANULOCYTES: 0.5 %
LIPASE: 472.4 U/L (ref 5.6–51.3)
LYMPHOCYTES # BLD: 9.2 %
LYMPHOCYTES ABSOLUTE: 1.2 THOU/MM3 (ref 1–4.8)
MCH RBC QN AUTO: 29.9 PG (ref 26–33)
MCHC RBC AUTO-ENTMCNC: 32.4 GM/DL (ref 32.2–35.5)
MCV RBC AUTO: 92.2 FL (ref 80–94)
MONOCYTES # BLD: 8 %
MONOCYTES ABSOLUTE: 1.1 THOU/MM3 (ref 0.4–1.3)
NUCLEATED RED BLOOD CELLS: 0 /100 WBC
PLATELET # BLD: 347 THOU/MM3 (ref 130–400)
PMV BLD AUTO: 9.5 FL (ref 9.4–12.4)
RBC # BLD: 5.15 MILL/MM3 (ref 4.7–6.1)
SEG NEUTROPHILS: 82.1 %
SEGMENTED NEUTROPHILS ABSOLUTE COUNT: 10.8 THOU/MM3 (ref 1.8–7.7)
TOTAL PROTEIN: 6.9 G/DL (ref 6.1–8)
WBC # BLD: 13.2 THOU/MM3 (ref 4.8–10.8)

## 2020-07-18 PROCEDURE — 36415 COLL VENOUS BLD VENIPUNCTURE: CPT

## 2020-07-18 PROCEDURE — 85025 COMPLETE CBC W/AUTO DIFF WBC: CPT

## 2020-07-18 PROCEDURE — 2580000003 HC RX 258: Performed by: SURGERY

## 2020-07-18 PROCEDURE — 83690 ASSAY OF LIPASE: CPT

## 2020-07-18 PROCEDURE — 99232 SBSQ HOSP IP/OBS MODERATE 35: CPT | Performed by: SURGERY

## 2020-07-18 PROCEDURE — 6360000002 HC RX W HCPCS: Performed by: SURGERY

## 2020-07-18 PROCEDURE — 82150 ASSAY OF AMYLASE: CPT

## 2020-07-18 PROCEDURE — 80076 HEPATIC FUNCTION PANEL: CPT

## 2020-07-18 PROCEDURE — 1200000000 HC SEMI PRIVATE

## 2020-07-18 RX ADMIN — PIPERACILLIN AND TAZOBACTAM 3.38 G: 3; .375 INJECTION, POWDER, FOR SOLUTION INTRAVENOUS at 21:38

## 2020-07-18 RX ADMIN — HYDROMORPHONE HYDROCHLORIDE 0.5 MG: 1 INJECTION, SOLUTION INTRAMUSCULAR; INTRAVENOUS; SUBCUTANEOUS at 23:32

## 2020-07-18 RX ADMIN — HYDROMORPHONE HYDROCHLORIDE 0.5 MG: 1 INJECTION, SOLUTION INTRAMUSCULAR; INTRAVENOUS; SUBCUTANEOUS at 00:07

## 2020-07-18 RX ADMIN — SODIUM CHLORIDE: 9 INJECTION, SOLUTION INTRAVENOUS at 03:28

## 2020-07-18 RX ADMIN — HYDROMORPHONE HYDROCHLORIDE 0.5 MG: 1 INJECTION, SOLUTION INTRAMUSCULAR; INTRAVENOUS; SUBCUTANEOUS at 19:19

## 2020-07-18 RX ADMIN — PIPERACILLIN AND TAZOBACTAM 3.38 G: 3; .375 INJECTION, POWDER, FOR SOLUTION INTRAVENOUS at 11:55

## 2020-07-18 RX ADMIN — HYDROMORPHONE HYDROCHLORIDE 0.5 MG: 1 INJECTION, SOLUTION INTRAMUSCULAR; INTRAVENOUS; SUBCUTANEOUS at 12:53

## 2020-07-18 RX ADMIN — PIPERACILLIN AND TAZOBACTAM 3.38 G: 3; .375 INJECTION, POWDER, FOR SOLUTION INTRAVENOUS at 03:28

## 2020-07-18 ASSESSMENT — PAIN - FUNCTIONAL ASSESSMENT: PAIN_FUNCTIONAL_ASSESSMENT: PREVENTS OR INTERFERES SOME ACTIVE ACTIVITIES AND ADLS

## 2020-07-18 ASSESSMENT — PAIN DESCRIPTION - PROGRESSION
CLINICAL_PROGRESSION: GRADUALLY IMPROVING
CLINICAL_PROGRESSION: GRADUALLY WORSENING
CLINICAL_PROGRESSION: GRADUALLY WORSENING
CLINICAL_PROGRESSION: GRADUALLY IMPROVING

## 2020-07-18 ASSESSMENT — PAIN SCALES - GENERAL
PAINLEVEL_OUTOF10: 4
PAINLEVEL_OUTOF10: 4
PAINLEVEL_OUTOF10: 7
PAINLEVEL_OUTOF10: 4
PAINLEVEL_OUTOF10: 4
PAINLEVEL_OUTOF10: 7
PAINLEVEL_OUTOF10: 5
PAINLEVEL_OUTOF10: 7
PAINLEVEL_OUTOF10: 7

## 2020-07-18 ASSESSMENT — PAIN DESCRIPTION - ONSET: ONSET: ON-GOING

## 2020-07-18 ASSESSMENT — PAIN DESCRIPTION - LOCATION
LOCATION: ABDOMEN
LOCATION: ABDOMEN

## 2020-07-18 ASSESSMENT — PAIN DESCRIPTION - PAIN TYPE
TYPE: ACUTE PAIN
TYPE: ACUTE PAIN

## 2020-07-18 ASSESSMENT — PAIN DESCRIPTION - FREQUENCY: FREQUENCY: CONTINUOUS

## 2020-07-18 ASSESSMENT — PAIN DESCRIPTION - DESCRIPTORS: DESCRIPTORS: SHARP

## 2020-07-18 ASSESSMENT — PAIN DESCRIPTION - ORIENTATION
ORIENTATION: RIGHT;UPPER;MID
ORIENTATION: RIGHT;UPPER;MID

## 2020-07-18 NOTE — H&P
[address] [id number]                              HISTORY AND PHYSICAL    PATIENT NAME: Timbo Loya                   :        1995  MED REC NO:   [id number]                           ROOM:       [id number]  ACCOUNT NO:   [de-identified]                           ADMIT DATE: 2020  PROVIDER:     Brandt Gaytan. Daniel Muller MD    CHIEF COMPLAINT:  Probable gallstone pancreatitis. HISTORY OF PRESENT ILLNESS:  The patient is a 22-year-old white male who  I had just seen in the office three days ago, who at the end of 2020  had an onset of two or three days of epigastric and right upper quadrant  pain. He thought initially he might have an ulcer. He took some acid  medications. It did not really help. He presented to the emergency  room on 2020. Workup with a CT scan revealed cholelithiasis,  possible mild acute cholecystitis and he is referred to my office and as  said, he was just seen three days ago. He was having no acute pain. He  was set up to have his gallbladder removed later this month. When he  woke up basically this morning with significant abdominal pain, he  thought it severe enough to come to the emergency room. In the ER, he  was noted to have a lipase greater than 3000 and he is being admitted  for presumed gallstone pancreatitis. The patient does not drink  alcohol. Also, he is noted to have a bilirubin of 6. PAST MEDICAL HISTORY:  Negative for any medical illnesses. PAST SURGICAL HISTORY:  He has had no prior surgeries. FAMILY HISTORY:  He does have some siblings who had gallbladder removed. ALLERGIES:  None. MEDICATIONS:  None. PHYSICAL EXAMINATION:  GENERAL:  The patient is a 22-year-old white male. He is resting in the  ER bed. He appears pale. He is in no acute distress.   HEAD, EARS, EYES, NOSE, AND THROAT:  Pupils actually are equal.  Sclerae  do not appear to be icteric at this time. EOMs are intact. TMJs are  normal.  NECK:  Soft. No palpable masses. CARDIAC:  S1, S2.  LUNGS:  Respirations are clear. No crepitance to the chest wall. ABDOMEN:  Obese. All quadrants are negative except the epigastrium,  right upper quadrant. He does have some mild guarding. EXTREMITIES:  Upper and lower extremities are normal.    LABORATORY DATA:  Reveals a sodium of 139, a potassium of 3.7. He has a  glucose of 172. He had liver function tests showing an ALT of 395, a  bilirubin of 6.1 with a direct of 4.9. Lipase was in excess of 3000. His white count was 9.1. Hemoglobin was 16.9. DIAGNOSTIC DATA:  We elected to perform an MRI versus as a CT scan to go  ahead and check the common bile duct and the MRI shows multiple  gallbladder stones layering. This common duct was felt to be normal.   No choledocholithiasis. Mild peripancreatic fluid or inflammation  consistent with pancreatitis. ASSESSMENT AND PLAN:  Probable gallstone pancreatitis. We would assume  a stone may have passed creating some edema as there are no stones in  the gallbladder. We will admit the patient. IV hydration, begin  antibiotics. We will recheck lipase in the morning. The patient will  require his gallbladder be removed this admission. MICHAEL FELIX [facility], MD    D: 07/17/2020 21:58:14       T: 07/17/2020 22:08:22     [email or web address]  Job#: [id number]     Doc#: [id number]    CC:

## 2020-07-18 NOTE — PROGRESS NOTES
[facility]   Dr. Cardenas Paget  Daily Progress Note  Pt Name: Elle Conklin  Medical Record Number: [id number]  Date of Birth 1995   Today's Date: 7/18/2020    HD#1    CHIEF COMPLAINT gallstone pancreatitis    SUBJECTIVE  Patient feels well. OBJECTIVE  CURRENT VITALS /81   Pulse 105   Temp 98.2 °F (36.8 °C) (Oral)   Resp 16   Ht 5' 10\" (1.778 m)   Wt 300 lb (136.1 kg)   SpO2 92%   BMI 43.05 kg/m²   LUNGS: Lungs clear   ABDOMEN: softer mild RUQ pain  WOUNDS: n/a  24 HR INTAKE/OUTPUT :     Intake/Output Summary (Last 24 hours) at 7/18/2020 1120  Last data filed at 7/18/2020 0331  Gross per 24 hour   Intake 1990.47 ml   Output --   Net 1990.47 ml     DRAIN/TUBE OUTPUT :      LABS  CBC :   Lab Results   Component Value Date    WBC 13.2 07/18/2020    HGB 15.4 07/18/2020    HCT 47.5 07/18/2020     07/18/2020     BMP:   Lab Results   Component Value Date     07/17/2020    K 3.7 07/17/2020     07/17/2020    CO2 25 07/17/2020    BUN 9 07/17/2020    CREATININE 0.9 07/17/2020    MG 2.2 07/17/2020   Results for Marce Bahena (MRN [id number]) as of 7/18/2020 11:22   Ref. Range 7/17/2020 06:45 7/17/2020 06:51 7/17/2020 07:41 7/17/2020 09:41 7/18/2020 07:24   Albumin Latest Ref Range: 3.5 - 5.1 g/dL 4.3    3.6   Alk Phos Latest Ref Range: 38 - 126 U/L 148 (H)    119   ALT Latest Ref Range: 11 - 66 U/L 397 (H)    259 (H)   Amylase Latest Ref Range: 20 - 104 U/L     629 (H)   AST Latest Ref Range: 5 - 40 U/L 99 (H)    60 (H)   Bilirubin Latest Ref Range: 0.3 - 1.2 mg/dL 6.1 (H)    2.3 (H)   Bilirubin, Direct Latest Ref Range: 0.0 - 0.3 mg/dL 4.9 (H)    1.2 (H)   Lipase Latest Ref Range: 5.6 - 51.3 U/L     472.4 (H)   Total Protein Latest Ref Range: 6.1 - 8.0 g/dL 7.9    6.9       ASSESSMENT  1. MRI neg CBD stones labs improved  Will proceed with emerita palacio in am  Discussed with pt      PLAN  1.   As above      Zaina Nicolas  Electronically signed 7/18/2020 at 11:20 AM

## 2020-07-18 NOTE — PLAN OF CARE
Problem: Pain:  Goal: Pain level will decrease  Description: Pain level will decrease  Outcome: Ongoing  Note: Pain Assessment: 0-10  Pain level: 7  Patient's Stated Pain Goal: 4   Is pain goal met at this time? No   Patient was able to fall asleep and rest comfortable post pain medication administration. Will monitor. Non-Pharmaceutical Pain Intervention(s): Rest, Repositioned   Goal: Control of acute pain  Description: Control of acute pain  Outcome: Ongoing  Note: Pain Assessment: 0-10  Pain level: 7  Patient's Stated Pain Goal: 4   Is pain goal met at this time? No   Patient was able to fall asleep and rest comfortable post pain medication administration. Will monitor. Non-Pharmaceutical Pain Intervention(s): Rest, Repositioned   Goal: Control of chronic pain  Description: Control of chronic pain  Outcome: Ongoing  Note: Denies any chronic pain. Will monitor. Problem: Infection:  Goal: Will remain free from infection  Description: Will remain free from infection  Outcome: Ongoing  Note:  No fevers, tachycardia, hypotension noted. Pt denies any complaints other than pain. Patient is on zosyn. Will monitor. Problem: Daily Care:  Goal: Daily care needs are met  Description: Daily care needs are met  Outcome: Ongoing  Note: Patient able to verbalize needs. Assisting as they arise. Patient is able to be active in some activities of daily living. Will monitor. Problem: Skin Integrity:  Goal: Skin integrity will stabilize  Description: Skin integrity will stabilize  Outcome: Ongoing  Note: No new skin breakdown noted this shift. Patient able to reposition self. Education on the importance of turning and repositioning was given. Patient verbalizes understanding. Will monitor. Problem: Discharge Planning:  Goal: Patients continuum of care needs are met  Description: Patients continuum of care needs are met  Outcome: Ongoing  Note: Patient is planning discharge home with mom. Will monitor. Problem: Cardiovascular  Goal: No DVT, peripheral vascular complications  Outcome: Ongoing  Note: Pt without s/s of DVT. Pt has SCD,S in place to help prevent development of DVT. Will monitor. Problem: GI  Goal: No bowel complications  Outcome: Ongoing  Note: Pt with hypoactive bowel sounds, not passing flatus, and without nausea. Patient is unsure when last BM was. Will monitor. Problem:   Goal: Adequate urinary output  Outcome: Ongoing  Note: Patient is voiding adequately without difficulty. Will monitor. Care plan reviewed with patient. Patient verbalize understanding of the plan of care and contribute to goal setting.

## 2020-07-19 ENCOUNTER — APPOINTMENT (OUTPATIENT)
Dept: GENERAL RADIOLOGY | Age: 25
DRG: 263 | End: 2020-07-19
Payer: MEDICAID

## 2020-07-19 ENCOUNTER — ANESTHESIA (OUTPATIENT)
Dept: OPERATING ROOM | Age: 25
DRG: 263 | End: 2020-07-19
Payer: MEDICAID

## 2020-07-19 ENCOUNTER — ANESTHESIA EVENT (OUTPATIENT)
Dept: OPERATING ROOM | Age: 25
DRG: 263 | End: 2020-07-19
Payer: MEDICAID

## 2020-07-19 VITALS — SYSTOLIC BLOOD PRESSURE: 120 MMHG | DIASTOLIC BLOOD PRESSURE: 62 MMHG | TEMPERATURE: 99.5 F | OXYGEN SATURATION: 91 %

## 2020-07-19 LAB — SARS-COV-2, PCR: NOT DETECTED

## 2020-07-19 PROCEDURE — 74300 X-RAY BILE DUCTS/PANCREAS: CPT

## 2020-07-19 PROCEDURE — 2780000010 HC IMPLANT OTHER: Performed by: SURGERY

## 2020-07-19 PROCEDURE — 6360000002 HC RX W HCPCS: Performed by: SURGERY

## 2020-07-19 PROCEDURE — 2500000003 HC RX 250 WO HCPCS: Performed by: SURGERY

## 2020-07-19 PROCEDURE — 6370000000 HC RX 637 (ALT 250 FOR IP): Performed by: SURGERY

## 2020-07-19 PROCEDURE — 3600000012 HC SURGERY LEVEL 2 ADDTL 15MIN: Performed by: SURGERY

## 2020-07-19 PROCEDURE — 2580000003 HC RX 258: Performed by: NURSE ANESTHETIST, CERTIFIED REGISTERED

## 2020-07-19 PROCEDURE — 88304 TISSUE EXAM BY PATHOLOGIST: CPT

## 2020-07-19 PROCEDURE — C1894 INTRO/SHEATH, NON-LASER: HCPCS | Performed by: SURGERY

## 2020-07-19 PROCEDURE — 3700000000 HC ANESTHESIA ATTENDED CARE: Performed by: SURGERY

## 2020-07-19 PROCEDURE — BF141ZZ FLUOROSCOPY OF GALLBLADDER, BILE DUCTS AND PANCREATIC DUCTS USING LOW OSMOLAR CONTRAST: ICD-10-PCS | Performed by: SURGERY

## 2020-07-19 PROCEDURE — 47563 LAPARO CHOLECYSTECTOMY/GRAPH: CPT | Performed by: SURGERY

## 2020-07-19 PROCEDURE — 94760 N-INVAS EAR/PLS OXIMETRY 1: CPT

## 2020-07-19 PROCEDURE — 3700000001 HC ADD 15 MINUTES (ANESTHESIA): Performed by: SURGERY

## 2020-07-19 PROCEDURE — 6360000002 HC RX W HCPCS: Performed by: NURSE ANESTHETIST, CERTIFIED REGISTERED

## 2020-07-19 PROCEDURE — U0002 COVID-19 LAB TEST NON-CDC: HCPCS

## 2020-07-19 PROCEDURE — 0FT44ZZ RESECTION OF GALLBLADDER, PERCUTANEOUS ENDOSCOPIC APPROACH: ICD-10-PCS | Performed by: SURGERY

## 2020-07-19 PROCEDURE — 2709999900 HC NON-CHARGEABLE SUPPLY: Performed by: SURGERY

## 2020-07-19 PROCEDURE — 7100000001 HC PACU RECOVERY - ADDTL 15 MIN: Performed by: SURGERY

## 2020-07-19 PROCEDURE — 7100000000 HC PACU RECOVERY - FIRST 15 MIN: Performed by: SURGERY

## 2020-07-19 PROCEDURE — 3600000002 HC SURGERY LEVEL 2 BASE: Performed by: SURGERY

## 2020-07-19 PROCEDURE — 6360000004 HC RX CONTRAST MEDICATION: Performed by: SURGERY

## 2020-07-19 PROCEDURE — 1200000000 HC SEMI PRIVATE

## 2020-07-19 PROCEDURE — 2580000003 HC RX 258: Performed by: SURGERY

## 2020-07-19 PROCEDURE — 2500000003 HC RX 250 WO HCPCS: Performed by: NURSE ANESTHETIST, CERTIFIED REGISTERED

## 2020-07-19 DEVICE — AGENT HEMOSTATIC SURGIFLOW MATRIX KIT W/THROMBIN: Type: IMPLANTABLE DEVICE | Site: ABDOMEN | Status: FUNCTIONAL

## 2020-07-19 RX ORDER — SODIUM CHLORIDE 9 MG/ML
INJECTION, SOLUTION INTRAVENOUS CONTINUOUS PRN
Status: DISCONTINUED | OUTPATIENT
Start: 2020-07-19 | End: 2020-07-19 | Stop reason: SDUPTHER

## 2020-07-19 RX ORDER — BUPIVACAINE HYDROCHLORIDE AND EPINEPHRINE 5; 5 MG/ML; UG/ML
INJECTION, SOLUTION EPIDURAL; INTRACAUDAL; PERINEURAL PRN
Status: DISCONTINUED | OUTPATIENT
Start: 2020-07-19 | End: 2020-07-19 | Stop reason: HOSPADM

## 2020-07-19 RX ORDER — HYDROMORPHONE HCL 110MG/55ML
PATIENT CONTROLLED ANALGESIA SYRINGE INTRAVENOUS PRN
Status: DISCONTINUED | OUTPATIENT
Start: 2020-07-19 | End: 2020-07-19 | Stop reason: SDUPTHER

## 2020-07-19 RX ORDER — MIDAZOLAM HYDROCHLORIDE 1 MG/ML
INJECTION INTRAMUSCULAR; INTRAVENOUS PRN
Status: DISCONTINUED | OUTPATIENT
Start: 2020-07-19 | End: 2020-07-19 | Stop reason: SDUPTHER

## 2020-07-19 RX ORDER — PROPOFOL 10 MG/ML
INJECTION, EMULSION INTRAVENOUS PRN
Status: DISCONTINUED | OUTPATIENT
Start: 2020-07-19 | End: 2020-07-19 | Stop reason: SDUPTHER

## 2020-07-19 RX ORDER — DEXAMETHASONE SODIUM PHOSPHATE 4 MG/ML
INJECTION, SOLUTION INTRA-ARTICULAR; INTRALESIONAL; INTRAMUSCULAR; INTRAVENOUS; SOFT TISSUE PRN
Status: DISCONTINUED | OUTPATIENT
Start: 2020-07-19 | End: 2020-07-19 | Stop reason: SDUPTHER

## 2020-07-19 RX ORDER — GLYCOPYRROLATE 1 MG/5 ML
SYRINGE (ML) INTRAVENOUS PRN
Status: DISCONTINUED | OUTPATIENT
Start: 2020-07-19 | End: 2020-07-19 | Stop reason: SDUPTHER

## 2020-07-19 RX ORDER — OXYCODONE HYDROCHLORIDE AND ACETAMINOPHEN 5; 325 MG/1; MG/1
1 TABLET ORAL EVERY 4 HOURS PRN
Status: DISCONTINUED | OUTPATIENT
Start: 2020-07-19 | End: 2020-07-20 | Stop reason: HOSPADM

## 2020-07-19 RX ORDER — ROCURONIUM BROMIDE 10 MG/ML
INJECTION, SOLUTION INTRAVENOUS PRN
Status: DISCONTINUED | OUTPATIENT
Start: 2020-07-19 | End: 2020-07-19 | Stop reason: SDUPTHER

## 2020-07-19 RX ORDER — PROMETHAZINE HYDROCHLORIDE 25 MG/ML
12.5 INJECTION, SOLUTION INTRAMUSCULAR; INTRAVENOUS
Status: DISCONTINUED | OUTPATIENT
Start: 2020-07-19 | End: 2020-07-19 | Stop reason: HOSPADM

## 2020-07-19 RX ORDER — NEOSTIGMINE METHYLSULFATE 5 MG/5 ML
SYRINGE (ML) INTRAVENOUS PRN
Status: DISCONTINUED | OUTPATIENT
Start: 2020-07-19 | End: 2020-07-19 | Stop reason: SDUPTHER

## 2020-07-19 RX ORDER — FENTANYL CITRATE 50 UG/ML
INJECTION, SOLUTION INTRAMUSCULAR; INTRAVENOUS PRN
Status: DISCONTINUED | OUTPATIENT
Start: 2020-07-19 | End: 2020-07-19 | Stop reason: SDUPTHER

## 2020-07-19 RX ORDER — LABETALOL 20 MG/4 ML (5 MG/ML) INTRAVENOUS SYRINGE
10 EVERY 10 MIN PRN
Status: DISCONTINUED | OUTPATIENT
Start: 2020-07-19 | End: 2020-07-19 | Stop reason: HOSPADM

## 2020-07-19 RX ORDER — LIDOCAINE HCL/PF 100 MG/5ML
SYRINGE (ML) INJECTION PRN
Status: DISCONTINUED | OUTPATIENT
Start: 2020-07-19 | End: 2020-07-19 | Stop reason: SDUPTHER

## 2020-07-19 RX ORDER — FENTANYL CITRATE 50 UG/ML
50 INJECTION, SOLUTION INTRAMUSCULAR; INTRAVENOUS EVERY 5 MIN PRN
Status: DISCONTINUED | OUTPATIENT
Start: 2020-07-19 | End: 2020-07-19 | Stop reason: HOSPADM

## 2020-07-19 RX ORDER — ONDANSETRON 2 MG/ML
INJECTION INTRAMUSCULAR; INTRAVENOUS PRN
Status: DISCONTINUED | OUTPATIENT
Start: 2020-07-19 | End: 2020-07-19 | Stop reason: SDUPTHER

## 2020-07-19 RX ADMIN — Medication 100 MG: at 12:24

## 2020-07-19 RX ADMIN — ONDANSETRON HYDROCHLORIDE 4 MG: 4 INJECTION, SOLUTION INTRAMUSCULAR; INTRAVENOUS at 12:38

## 2020-07-19 RX ADMIN — HYDROMORPHONE HYDROCHLORIDE 0.5 MG: 1 INJECTION, SOLUTION INTRAMUSCULAR; INTRAVENOUS; SUBCUTANEOUS at 03:30

## 2020-07-19 RX ADMIN — FENTANYL CITRATE 100 MCG: 50 INJECTION, SOLUTION INTRAMUSCULAR; INTRAVENOUS at 12:24

## 2020-07-19 RX ADMIN — SODIUM CHLORIDE: 9 INJECTION, SOLUTION INTRAVENOUS at 09:36

## 2020-07-19 RX ADMIN — ROCURONIUM BROMIDE 10 MG: 10 INJECTION INTRAVENOUS at 12:50

## 2020-07-19 RX ADMIN — PIPERACILLIN AND TAZOBACTAM 3.38 G: 3; .375 INJECTION, POWDER, FOR SOLUTION INTRAVENOUS at 21:34

## 2020-07-19 RX ADMIN — HYDROMORPHONE HYDROCHLORIDE 0.5 MG: 2 INJECTION INTRAMUSCULAR; INTRAVENOUS; SUBCUTANEOUS at 13:00

## 2020-07-19 RX ADMIN — SODIUM CHLORIDE: 9 INJECTION, SOLUTION INTRAVENOUS at 12:14

## 2020-07-19 RX ADMIN — ROCURONIUM BROMIDE 40 MG: 10 INJECTION INTRAVENOUS at 12:24

## 2020-07-19 RX ADMIN — SODIUM CHLORIDE: 9 INJECTION, SOLUTION INTRAVENOUS at 00:47

## 2020-07-19 RX ADMIN — ROCURONIUM BROMIDE 10 MG: 10 INJECTION INTRAVENOUS at 13:05

## 2020-07-19 RX ADMIN — OXYCODONE HYDROCHLORIDE AND ACETAMINOPHEN 1 TABLET: 5; 325 TABLET ORAL at 18:12

## 2020-07-19 RX ADMIN — FENTANYL CITRATE 100 MCG: 50 INJECTION, SOLUTION INTRAMUSCULAR; INTRAVENOUS at 12:52

## 2020-07-19 RX ADMIN — HYDROMORPHONE HYDROCHLORIDE 0.5 MG: 2 INJECTION INTRAMUSCULAR; INTRAVENOUS; SUBCUTANEOUS at 13:11

## 2020-07-19 RX ADMIN — Medication 5 MG: at 13:25

## 2020-07-19 RX ADMIN — PROPOFOL 170 MG: 10 INJECTION, EMULSION INTRAVENOUS at 12:24

## 2020-07-19 RX ADMIN — Medication 10 MG: at 12:38

## 2020-07-19 RX ADMIN — MIDAZOLAM HYDROCHLORIDE 2 MG: 1 INJECTION, SOLUTION INTRAMUSCULAR; INTRAVENOUS at 12:24

## 2020-07-19 RX ADMIN — SODIUM CHLORIDE: 9 INJECTION, SOLUTION INTRAVENOUS at 12:45

## 2020-07-19 RX ADMIN — Medication 0.8 MG: at 13:25

## 2020-07-19 RX ADMIN — PIPERACILLIN AND TAZOBACTAM 3.38 G: 3; .375 INJECTION, POWDER, FOR SOLUTION INTRAVENOUS at 05:41

## 2020-07-19 RX ADMIN — HYDROMORPHONE HYDROCHLORIDE 0.25 MG: 1 INJECTION, SOLUTION INTRAMUSCULAR; INTRAVENOUS; SUBCUTANEOUS at 08:06

## 2020-07-19 RX ADMIN — PIPERACILLIN AND TAZOBACTAM 3.38 G: 3; .375 INJECTION, POWDER, FOR SOLUTION INTRAVENOUS at 14:42

## 2020-07-19 RX ADMIN — HYDROMORPHONE HYDROCHLORIDE 0.25 MG: 1 INJECTION, SOLUTION INTRAMUSCULAR; INTRAVENOUS; SUBCUTANEOUS at 19:36

## 2020-07-19 RX ADMIN — DEXAMETHASONE SODIUM PHOSPHATE 10 MG: 4 INJECTION, SOLUTION INTRAMUSCULAR; INTRAVENOUS at 12:40

## 2020-07-19 ASSESSMENT — PULMONARY FUNCTION TESTS
PIF_VALUE: 3
PIF_VALUE: 34
PIF_VALUE: 42
PIF_VALUE: 30
PIF_VALUE: 30
PIF_VALUE: 27
PIF_VALUE: 35
PIF_VALUE: 17
PIF_VALUE: 17
PIF_VALUE: 30
PIF_VALUE: 33
PIF_VALUE: 30
PIF_VALUE: 5
PIF_VALUE: 30
PIF_VALUE: 30
PIF_VALUE: 33
PIF_VALUE: 28
PIF_VALUE: 35
PIF_VALUE: 33
PIF_VALUE: 31
PIF_VALUE: 30
PIF_VALUE: 29
PIF_VALUE: 0
PIF_VALUE: 31
PIF_VALUE: 33
PIF_VALUE: 17
PIF_VALUE: 32
PIF_VALUE: 32
PIF_VALUE: 31
PIF_VALUE: 32
PIF_VALUE: 4
PIF_VALUE: 4
PIF_VALUE: 33
PIF_VALUE: 1
PIF_VALUE: 32
PIF_VALUE: 32
PIF_VALUE: 4
PIF_VALUE: 8
PIF_VALUE: 22
PIF_VALUE: 31
PIF_VALUE: 17
PIF_VALUE: 26
PIF_VALUE: 32
PIF_VALUE: 4
PIF_VALUE: 29
PIF_VALUE: 32
PIF_VALUE: 31
PIF_VALUE: 35
PIF_VALUE: 30
PIF_VALUE: 33
PIF_VALUE: 18
PIF_VALUE: 30
PIF_VALUE: 4
PIF_VALUE: 1
PIF_VALUE: 32
PIF_VALUE: 0
PIF_VALUE: 30
PIF_VALUE: 3
PIF_VALUE: 30
PIF_VALUE: 18
PIF_VALUE: 33
PIF_VALUE: 35
PIF_VALUE: 30
PIF_VALUE: 32
PIF_VALUE: 34
PIF_VALUE: 32
PIF_VALUE: 34
PIF_VALUE: 25
PIF_VALUE: 17
PIF_VALUE: 32
PIF_VALUE: 12
PIF_VALUE: 32
PIF_VALUE: 1
PIF_VALUE: 24
PIF_VALUE: 35
PIF_VALUE: 1
PIF_VALUE: 30
PIF_VALUE: 32
PIF_VALUE: 31
PIF_VALUE: 32
PIF_VALUE: 35
PIF_VALUE: 35
PIF_VALUE: 7
PIF_VALUE: 25

## 2020-07-19 ASSESSMENT — PAIN SCALES - GENERAL
PAINLEVEL_OUTOF10: 6
PAINLEVEL_OUTOF10: 4
PAINLEVEL_OUTOF10: 6
PAINLEVEL_OUTOF10: 0
PAINLEVEL_OUTOF10: 7
PAINLEVEL_OUTOF10: 6
PAINLEVEL_OUTOF10: 6
PAINLEVEL_OUTOF10: 7
PAINLEVEL_OUTOF10: 5

## 2020-07-19 ASSESSMENT — PAIN DESCRIPTION - FREQUENCY
FREQUENCY: CONTINUOUS
FREQUENCY: CONTINUOUS

## 2020-07-19 ASSESSMENT — PAIN DESCRIPTION - ONSET
ONSET: ON-GOING
ONSET: ON-GOING

## 2020-07-19 ASSESSMENT — PAIN DESCRIPTION - PROGRESSION
CLINICAL_PROGRESSION: GRADUALLY WORSENING
CLINICAL_PROGRESSION: NOT CHANGED
CLINICAL_PROGRESSION: GRADUALLY WORSENING
CLINICAL_PROGRESSION: NOT CHANGED
CLINICAL_PROGRESSION: GRADUALLY WORSENING
CLINICAL_PROGRESSION: GRADUALLY WORSENING

## 2020-07-19 ASSESSMENT — PAIN - FUNCTIONAL ASSESSMENT
PAIN_FUNCTIONAL_ASSESSMENT: ACTIVITIES ARE NOT PREVENTED
PAIN_FUNCTIONAL_ASSESSMENT: ACTIVITIES ARE NOT PREVENTED

## 2020-07-19 ASSESSMENT — PAIN DESCRIPTION - ORIENTATION
ORIENTATION: RIGHT;UPPER;MID
ORIENTATION: UPPER

## 2020-07-19 ASSESSMENT — PAIN DESCRIPTION - LOCATION
LOCATION: ABDOMEN

## 2020-07-19 ASSESSMENT — PAIN DESCRIPTION - DESCRIPTORS
DESCRIPTORS: SHARP
DESCRIPTORS: ACHING

## 2020-07-19 ASSESSMENT — PAIN DESCRIPTION - PAIN TYPE
TYPE: ACUTE PAIN
TYPE: SURGICAL PAIN
TYPE: ACUTE PAIN

## 2020-07-19 NOTE — ANESTHESIA POSTPROCEDURE EVALUATION
Department of Anesthesiology  Postprocedure Note    Patient: Jj Jackson  MRN: [id number]  YOB: 1995  Date of evaluation: 7/19/2020  Time:  3:05 PM     Procedure Summary     Date:  07/19/20 [facility] / [facility]:  [facility]    Anesthesia Start:  1214 Anesthesia Stop:  3586    Procedure:  CHOLECYSTECTOMY LAPAROSCOPIC, cholangiogram (N/A Abdomen) Diagnosis:  (Cholecystitis)    Surgeon: Rui Steven MD Responsible Provider:  Josué Young DO    Anesthesia Type:  general ASA Status:  3          Anesthesia Type: general    Ibeth Phase I: Ibeth Score: 9    Ibeth Phase II:      Last vitals: Reviewed and per EMR flowsheets. Anesthesia Post Evaluation    Comments: [address]  POST-ANESTHESIA NOTE       Name:  Jj Jackson                                         Age:  27 y.o. MRN:  [id number]      Last Vitals:  /77   Pulse 96   Temp 98.7 °F (37.1 °C) (Oral)   Resp 16   Ht 5' 10\" (1.778 m)   Wt 300 lb (136.1 kg)   SpO2 90%   BMI 43.05 kg/m²   Patient Vitals in the past 4 hrs:  07/19/20 1449, BP:133/77, Pulse:96, Resp:16  07/19/20 1430, BP:129/77, Temp:98.7 °F (37.1 °C), Temp src:Oral, Pulse:92, Resp:16, SpO2:90 %  07/19/20 1410, Pulse:96, Resp:17, SpO2:91 %  07/19/20 1405, BP:127/79, Pulse:100, Resp:14, SpO2:92 %  07/19/20 1400, BP:126/78, Pulse:116, Resp:18, SpO2:93 %  07/19/20 1355, BP:127/83, Pulse:115, Resp:15, SpO2:92 %  07/19/20 1350, BP:117/84, Pulse:119, Resp:18, SpO2:94 %  07/19/20 1345, BP:112/70, Pulse:117, Resp:20, SpO2:94 %  07/19/20 1341, Temp:98 °F (36.7 °C), Pulse:118, SpO2:93 %    Level of Consciousness:  Awake    Respiratory:  Stable    Oxygen Saturation:  Stable    Cardiovascular:  Stable    Hydration:  Adequate    PONV:  Stable    Post-op Pain:  Adequate analgesia    Post-op Assessment:  No apparent anesthetic complications    Additional Follow-Up / Treatment / Comment:  None    Twan Gama.  DO Niko  July 19, 2020   3:05 PM

## 2020-07-19 NOTE — PLAN OF CARE
Problem: Pain:  Description: Pain management should include both nonpharmacologic and pharmacologic interventions. Goal: Pain level will decrease  Description: Pain level will decrease  Outcome: Ongoing   Patient states pain relief from PRN pain medications. Pain reassessed one hour post PRN pain medication given. Patient stated pain goal 4. Rest and repositioning utilized for comfort. Problem: Infection:  Goal: Will remain free from infection  Description: Will remain free from infection  Outcome: Ongoing   Patient remains free from s/s infection. Afebrile this shift. CHG bathing utilized. Problem: Daily Care:  Goal: Daily care needs are met  Description: Daily care needs are met  Outcome: Ongoing   Patient independent with daily cares after setup. Problem: Skin Integrity:  Goal: Skin integrity will stabilize  Description: Skin integrity will stabilize  Outcome: Ongoing   No skin breakdown this shift. Patient turns per sef. Patients states understanding of repositioning every two hours. Problem: Discharge Planning:  Goal: Patients continuum of care needs are met  Description: Patients continuum of care needs are met  Outcome: Ongoing   Discharge plan is home with mom. Problem: Cardiovascular  Goal: No DVT, peripheral vascular complications  Outcome: Ongoing   Patient without s/s of DVT. SCD's in place to help prevent development of DVT. Problem: GI  Goal: No bowel complications  Outcome: Ongoing   Patient bowel sounds hypoactive. Passing flatus. Problem:   Goal: Adequate urinary output  Outcome: Ongoing   Patient voiding adequate amounts without difficulty. Care plan reviewed with patient and mom. Patient and mom verbalize understanding of the plan of care and contribute to goal setting.

## 2020-07-19 NOTE — ANESTHESIA PRE PROCEDURE
Department of Anesthesiology  Preprocedure Note       Name:  Martin Braun   Age:  27 y.o.  :  1995                                          MRN:  [id number]         Date:  2020      Surgeon: Sky Arreola):  Anjana Rivas MD    Procedure: Procedure(s):  CHOLECYSTECTOMY LAPAROSCOPIC    Medications prior to admission:   Prior to Admission medications    Not on File       Current medications:    Current Facility-Administered Medications   Medication Dose Route Frequency Provider Last Rate Last Dose    0.9 % sodium chloride infusion   Intravenous Continuous Anjana Rivas  mL/hr at 20 0936      sodium chloride flush 0.9 % injection 10 mL  10 mL Intravenous 2 times per day Anjana Rivas MD   10 mL at 20 1057    sodium chloride flush 0.9 % injection 10 mL  10 mL Intravenous PRN Anjana Rivas MD        ondansetron [facility]) injection 4 mg  4 mg Intravenous Q6H PRN Anjana Rivas MD   4 mg at 20 1100    HYDROmorphone (DILAUDID) injection 0.25 mg  0.25 mg Intravenous Q3H PRN Anjana Rivas MD   0.25 mg at 20 0806    Or    HYDROmorphone (DILAUDID) injection 0.5 mg  0.5 mg Intravenous Q3H PRN Anjana Rivas MD   0.5 mg at 20 0330    piperacillin-tazobactam (ZOSYN) 3.375 g in dextrose 5 % 50 mL IVPB extended infusion (mini-bag)  3.375 g Intravenous Q8H Anjana Rivas MD   Stopped at 20 9570       Allergies:  No Known Allergies    Problem List:    Patient Active Problem List   Diagnosis Code    Gallstone pancreatitis K85.10    Abdominal pain R10.9       Past Medical History:  No past medical history on file. Past Surgical History:  No past surgical history on file.     Social History:    Social History     Tobacco Use    Smoking status: Never Smoker    Smokeless tobacco: Never Used   Substance Use Topics    Alcohol use: Yes     Comment: social                                Counseling given: Not Answered      Vital Signs (Current):   Vitals:    07/18/20 2328 07/19/20 0328 07/19/20 0745 07/19/20 1011   BP: 131/72 130/72 121/75    Pulse: 112 103 105    Resp: 18 16 16    Temp: 99.2 °F (37.3 °C) 98.8 °F (37.1 °C) 98.8 °F (37.1 °C)    TempSrc: Oral Oral Oral    SpO2: 91% 92% 92% 92%   Weight:       Height:                                                  BP Readings from Last 3 Encounters:   07/19/20 121/75   07/14/20 126/84   07/02/20 105/61       NPO Status:                                                                                 BMI:   Wt Readings from Last 3 Encounters:   07/17/20 300 lb (136.1 kg)   07/14/20 (!) 309 lb (140.2 kg)   07/01/20 300 lb (136.1 kg)     Body mass index is 43.05 kg/m². CBC:   Lab Results   Component Value Date    WBC 13.2 07/18/2020    RBC 5.15 07/18/2020    HGB 15.4 07/18/2020    HCT 47.5 07/18/2020    MCV 92.2 07/18/2020     07/18/2020       CMP:   Lab Results   Component Value Date     07/17/2020    K 3.7 07/17/2020     07/17/2020    CO2 25 07/17/2020    BUN 9 07/17/2020    CREATININE 0.9 07/17/2020    LABGLOM >90 07/17/2020    GLUCOSE 172 07/17/2020    PROT 6.9 07/18/2020    CALCIUM 10.3 07/17/2020    BILITOT 2.3 07/18/2020    ALKPHOS 119 07/18/2020    AST 60 07/18/2020     07/18/2020       POC Tests: No results for input(s): POCGLU, POCNA, POCK, POCCL, POCBUN, POCHEMO, POCHCT in the last 72 hours.     Coags: No results found for: PROTIME, INR, APTT    HCG (If Applicable): No results found for: PREGTESTUR, PREGSERUM, HCG, HCGQUANT     ABGs: No results found for: PHART, PO2ART, IUY9GWM, LVP2AII, BEART, Y4WUOAHO     Type & Screen (If Applicable):  No results found for: LABABO, LABRH    Drug/Infectious Status (If Applicable):  No results found for: HIV, HEPCAB    COVID-19 Screening (If Applicable):   Lab Results   Component Value Date    COVID19 NOT DETECTED 07/19/2020         Anesthesia Evaluation  Patient summary reviewed  Airway: Mallampati: III  TM distance: >3 FB Neck ROM: full  Mouth opening: > = 3 FB Dental:          Pulmonary:                              Cardiovascular:                      Neuro/Psych:               GI/Hepatic/Renal:   (+) morbid obesity          Endo/Other:                     Abdominal:           Vascular:                                        Anesthesia Plan      general     ASA 3       Induction: intravenous. MIPS: Postoperative opioids intended and Prophylactic antiemetics administered. Anesthetic plan and risks discussed with patient and mother. Plan discussed with MOISES. Marinell Schwab.  [facility]   7/19/2020

## 2020-07-19 NOTE — PLAN OF CARE
Problem: Pain:  Goal: Pain level will decrease  Description: Pain level will decrease  Outcome: Ongoing  Note: Pt report pain at 4-7 on scale. Pt states iv medication helping to achieve pain goal of a 4 on scale. Goal: Control of acute pain  Description: Control of acute pain  Outcome: Ongoing  Note: Pt report pain at 4-7 on scale. Pt states iv medication helping to achieve pain goal of a 4 on scale. Goal: Control of chronic pain  Description: Control of chronic pain  Outcome: Ongoing  Note: Pt report pain at 4-7 on scale. Pt states iv medication helping to achieve pain goal of a 4 on scale. Problem: Infection:  Goal: Will remain free from infection  Description: Will remain free from infection  Outcome: Ongoing  Note: 99 & 99.2 degree temps so far this shift. Will continue to monitor. No other s/s of infection noted     Problem: Daily Care:  Goal: Daily care needs are met  Description: Daily care needs are met  Outcome: Ongoing  Note: Patient able to perform daily cares with min-moderate assistance. Patient uses call light when assistance is needed. Problem: Skin Integrity:  Goal: Skin integrity will stabilize  Description: Skin integrity will stabilize  Outcome: Ongoing  Note: No new skin impairments noted. Pt understands the importance of frequent repositioning in order to prevent any skin breakdown. Problem: Discharge Planning:  Goal: Patients continuum of care needs are met  Description: Patients continuum of care needs are met  Outcome: Ongoing  Note: Pt plans home with mom at discharge. Care manager and social working helping with discharge needs. Problem: Cardiovascular  Goal: No DVT, peripheral vascular complications  Outcome: Ongoing  Note: Pt without s/s of DVT. Problem: GI  Goal: No bowel complications  Outcome: Ongoing  Note: Pt with active bowel sounds, passing flatus, and without nausea.       Problem:   Goal: Adequate urinary output  Outcome: Ongoing  Note: Pt voiding adequate amounts without difficulty. Care plan reviewed with patient. Patient verbalizes understanding of the plan of care and contributes to goal setting.

## 2020-07-19 NOTE — BRIEF OP NOTE
Brief Postoperative Note      Patient: Robel Lamar  YOB: 1995  MRN: [id number]    Date of Procedure: 7/19/2020    Pre-Op Diagnosis: Cholelithiasis/Pancreatitis    Post-Op Diagnosis: same nl cholangiogram       Procedure(s):  CHOLECYSTECTOMY LAPAROSCOPIC, cholangiogram    Surgeon(s):  Amy Hassan MD    Assistant:      Anesthesia: General    Estimated Blood Loss (mL): 75 ml    Complications: none    Specimens:   ID Type Source Tests Collected by Time Destination   A : Gallbladder Tissue Gallbladder SURGICAL PATHOLOGY Amy Hassan MD 7/19/2020 1310        Implants:  Implant Name Type Inv.  Item Serial No.  Lot No. LRB No. Used Action   KIT SEALANT SURGIFLO HEMOSTATIC MATRIX Bone/Graft/Tissue/Human/Synth KIT SEALANT SURGIFLO HEMOSTATIC MATRIX  TOLU: Jo Ann Mckay [id number] N/A 1 Implanted         Drains:     Findings: see op note    Electronically signed by Amy Hassan MD on 7/19/2020 at 1:51 PM

## 2020-07-19 NOTE — OP NOTE
[address] [place] [address]                                OPERATIVE REPORT    PATIENT NAME: Zackary Nash                   :        1995  MED REC NO:   [id number]                           ROOM:       0021  ACCOUNT NO:   [de-identified]                           ADMIT DATE: 2020  PROVIDER:     Ed Lipscomb. Jeny Bosch MD    DATE OF PROCEDURE:  2020    PREOPERATIVE DIAGNOSIS:  Gallstone pancreatitis. POSTOPERATIVE DIAGNOSIS:  Gallstone pancreatitis. OPERATIONS:  Laparoscopic cholecystectomy with cholangiogram.    SURGEON:  Ed Lipscomb. MD Fidencio    ANESTHESIA:  General.    COMPLICATION:  Somewhat difficult dissection. BLOOD LOSS:  75 mL. INDICATION FOR PROCEDURE:  The patient is a 28-year-old white male with  gallstone pancreatitis. FINDINGS:  The patient had one normal cholangiogram film, unfortunately  there was extravasation with the other; however, I saw no evidence of  filling defects. There was a fair amount of inflammation due to the  pancreatitis. The cystic duct was quite widened as would be expected to  allow choledocholithiasis. We had to use two extra large clip appliers  to get across the cystic duct, again it was confirmed to be the cystic  duct based on the cholangiogram.    DESCRIPTION OF PROCEDURE:  The patient was brought to the operating  suite, placed supine on the operating room table. After adequate  inhalational anesthesia was administered, the patient's abdomen was  prepped and draped in the usual sterile fashion. Incision was made  right below the umbilicus. A Veress needle was placed and CO2 was  insufflated. However, I did not get proper pressures, and I placed an  OptiView trocar to place the camera into the abdominal cavity. We then  insufflated well. An epigastric port was placed, two lateral ports were  placed. The gallbladder had adhesions that had to be taken down. There  appeared to be some mild inflammation of the gallbladder. We retracted  the gallbladder superiorly, however, due to the inflammation in the  length of the gallbladder, I had to place another 5 mm port and placed a  5 mm fan to help depress the fatty tissue and then was able to identify  the neck of the gallbladder and elevated and it was quite long  gallbladder. With this done, there was inflammation in the  hepatoduodenal ligament, but we identified the tubular structure going  into the gallbladder, felt to be the cystic duct. We got behind it. I  did place a regular clip on the gallbladder side, although would only go  across about three-quarters of this tube. I then made a cut in this  tube, placed a cholangiocath through this stab wound and then we  inserted it into the cystic duct and took a cholangiogram.  There was an  excellent film, showing good flow proximally and distally into the  duodenum, which I had to take another film, but the catheter had become  dislodged. Cholangiocath was removed. We had to place two extra large  clips on the cystic duct as it was so wide and I had to replace the  epigastric port with a 12-port, this was done and two clips were placed  and the cystic duct was divided. Cystic artery was a little bit  difficult to dissect out due to the inflammation but this was  accomplished, and three clips placed on the cystic artery. The  gallbladder was then removed from the liver bed using electrocautery  which was somewhat difficult because of the length of the gallbladder  and it was somewhat also intrahepatic, however, this was accomplished. We removed the gallbladder from the abdominal cavity with an EndoCatch. We went back in, there appeared to be no active bleeding or bile leakage  that we could see. I did place Surgiflo into the gallbladder bed. Trocars were removed as air was aspirated out of the abdominal cavity.    Interrupted 4-0 Vicryl was used to close the skin.  Steri-Strips were  applied. MICHAEL FELIX [facility], MD    D: 07/19/2020 14:09:33       T: 07/19/2020 14:12:05     [email or web address]  Job#: [id number]     Doc#: [id number]    CC:

## 2020-07-20 VITALS
HEIGHT: 70 IN | RESPIRATION RATE: 18 BRPM | WEIGHT: 300 LBS | TEMPERATURE: 97.4 F | BODY MASS INDEX: 42.95 KG/M2 | DIASTOLIC BLOOD PRESSURE: 79 MMHG | OXYGEN SATURATION: 92 % | HEART RATE: 110 BPM | SYSTOLIC BLOOD PRESSURE: 126 MMHG

## 2020-07-20 PROBLEM — E66.01 MORBID OBESITY (HCC): Status: ACTIVE | Noted: 2020-07-20

## 2020-07-20 PROBLEM — K85.10 GALLSTONE PANCREATITIS: Status: RESOLVED | Noted: 2020-07-17 | Resolved: 2020-07-20

## 2020-07-20 PROBLEM — Z90.49 S/P LAPAROSCOPIC APPENDECTOMY: Status: ACTIVE | Noted: 2020-07-20

## 2020-07-20 LAB
ALBUMIN SERPL-MCNC: 3.2 G/DL (ref 3.5–5.1)
ALP BLD-CCNC: 71 U/L (ref 38–126)
ALT SERPL-CCNC: 127 U/L (ref 11–66)
AST SERPL-CCNC: 36 U/L (ref 5–40)
BILIRUB SERPL-MCNC: 1.1 MG/DL (ref 0.3–1.2)
BILIRUBIN DIRECT: 0.5 MG/DL (ref 0–0.3)
ERYTHROCYTE [DISTWIDTH] IN BLOOD BY AUTOMATED COUNT: 13.4 % (ref 11.5–14.5)
ERYTHROCYTE [DISTWIDTH] IN BLOOD BY AUTOMATED COUNT: 45.7 FL (ref 35–45)
HCT VFR BLD CALC: 41.4 % (ref 42–52)
HEMOGLOBIN: 13.2 GM/DL (ref 14–18)
LIPASE: 29.9 U/L (ref 5.6–51.3)
MCH RBC QN AUTO: 29.7 PG (ref 26–33)
MCHC RBC AUTO-ENTMCNC: 31.9 GM/DL (ref 32.2–35.5)
MCV RBC AUTO: 93.2 FL (ref 80–94)
PLATELET # BLD: 312 THOU/MM3 (ref 130–400)
PMV BLD AUTO: 9.7 FL (ref 9.4–12.4)
RBC # BLD: 4.44 MILL/MM3 (ref 4.7–6.1)
TOTAL PROTEIN: 6.4 G/DL (ref 6.1–8)
WBC # BLD: 15.2 THOU/MM3 (ref 4.8–10.8)

## 2020-07-20 PROCEDURE — 83690 ASSAY OF LIPASE: CPT

## 2020-07-20 PROCEDURE — 85027 COMPLETE CBC AUTOMATED: CPT

## 2020-07-20 PROCEDURE — 6370000000 HC RX 637 (ALT 250 FOR IP): Performed by: SURGERY

## 2020-07-20 PROCEDURE — 99024 POSTOP FOLLOW-UP VISIT: CPT | Performed by: NURSE PRACTITIONER

## 2020-07-20 PROCEDURE — 36415 COLL VENOUS BLD VENIPUNCTURE: CPT

## 2020-07-20 PROCEDURE — 6360000002 HC RX W HCPCS: Performed by: SURGERY

## 2020-07-20 PROCEDURE — 2580000003 HC RX 258: Performed by: SURGERY

## 2020-07-20 PROCEDURE — 80076 HEPATIC FUNCTION PANEL: CPT

## 2020-07-20 RX ORDER — OXYCODONE HYDROCHLORIDE AND ACETAMINOPHEN 5; 325 MG/1; MG/1
1 TABLET ORAL EVERY 6 HOURS PRN
Qty: 10 TABLET | Refills: 0 | Status: SHIPPED | OUTPATIENT
Start: 2020-07-20 | End: 2020-07-27

## 2020-07-20 RX ORDER — IBUPROFEN 800 MG/1
800 TABLET ORAL EVERY 6 HOURS PRN
Qty: 28 TABLET | Refills: 0 | Status: SHIPPED | OUTPATIENT
Start: 2020-07-20 | End: 2020-07-27

## 2020-07-20 RX ADMIN — PIPERACILLIN AND TAZOBACTAM 3.38 G: 3; .375 INJECTION, POWDER, FOR SOLUTION INTRAVENOUS at 04:34

## 2020-07-20 RX ADMIN — OXYCODONE HYDROCHLORIDE AND ACETAMINOPHEN 1 TABLET: 5; 325 TABLET ORAL at 15:46

## 2020-07-20 RX ADMIN — HYDROMORPHONE HYDROCHLORIDE 0.5 MG: 1 INJECTION, SOLUTION INTRAMUSCULAR; INTRAVENOUS; SUBCUTANEOUS at 00:02

## 2020-07-20 RX ADMIN — PIPERACILLIN AND TAZOBACTAM 3.38 G: 3; .375 INJECTION, POWDER, FOR SOLUTION INTRAVENOUS at 13:18

## 2020-07-20 RX ADMIN — OXYCODONE HYDROCHLORIDE AND ACETAMINOPHEN 1 TABLET: 5; 325 TABLET ORAL at 09:52

## 2020-07-20 RX ADMIN — OXYCODONE HYDROCHLORIDE AND ACETAMINOPHEN 1 TABLET: 5; 325 TABLET ORAL at 04:37

## 2020-07-20 ASSESSMENT — PAIN SCALES - GENERAL
PAINLEVEL_OUTOF10: 4
PAINLEVEL_OUTOF10: 7
PAINLEVEL_OUTOF10: 5
PAINLEVEL_OUTOF10: 4
PAINLEVEL_OUTOF10: 4
PAINLEVEL_OUTOF10: 5
PAINLEVEL_OUTOF10: 5

## 2020-07-20 ASSESSMENT — PAIN DESCRIPTION - DESCRIPTORS: DESCRIPTORS: SHARP

## 2020-07-20 ASSESSMENT — PAIN DESCRIPTION - PROGRESSION: CLINICAL_PROGRESSION: NOT CHANGED

## 2020-07-20 ASSESSMENT — PAIN - FUNCTIONAL ASSESSMENT: PAIN_FUNCTIONAL_ASSESSMENT: PREVENTS OR INTERFERES SOME ACTIVE ACTIVITIES AND ADLS

## 2020-07-20 ASSESSMENT — PAIN DESCRIPTION - PAIN TYPE: TYPE: SURGICAL PAIN

## 2020-07-20 ASSESSMENT — PAIN DESCRIPTION - LOCATION: LOCATION: ABDOMEN

## 2020-07-20 ASSESSMENT — PAIN DESCRIPTION - FREQUENCY: FREQUENCY: INTERMITTENT

## 2020-07-20 NOTE — PLAN OF CARE
Problem: Discharge Planning:  Goal: Patients continuum of care needs are met  Description: Patients continuum of care needs are met  7/20/2020 0400 by Hall Hamman, RN  Outcome: Ongoing

## 2020-07-20 NOTE — DISCHARGE SUMMARY
widened as would be expected to  allow choledocholithiasis. We had to use two extra large clip appliers  to get across the cystic duct, again it was confirmed to be the cystic  duct based on the cholangiogram.     DESCRIPTION OF PROCEDURE:  The patient was brought to the operating  suite, placed supine on the operating room table. After adequate  inhalational anesthesia was administered, the patient's abdomen was  prepped and draped in the usual sterile fashion. Incision was made  right below the umbilicus. A Veress needle was placed and CO2 was  insufflated. However, I did not get proper pressures, and I placed an  OptiView trocar to place the camera into the abdominal cavity. We then  insufflated well. An epigastric port was placed, two lateral ports were  placed. The gallbladder had adhesions that had to be taken down. There  appeared to be some mild inflammation of the gallbladder. We retracted  the gallbladder superiorly, however, due to the inflammation in the  length of the gallbladder, I had to place another 5 mm port and placed a  5 mm fan to help depress the fatty tissue and then was able to identify  the neck of the gallbladder and elevated and it was quite long  gallbladder. With this done, there was inflammation in the  hepatoduodenal ligament, but we identified the tubular structure going  into the gallbladder, felt to be the cystic duct. We got behind it. I  did place a regular clip on the gallbladder side, although would only go  across about three-quarters of this tube. I then made a cut in this  tube, placed a cholangiocath through this stab wound and then we  inserted it into the cystic duct and took a cholangiogram.  There was an  excellent film, showing good flow proximally and distally into the  duodenum, which I had to take another film, but the catheter had become  dislodged. Cholangiocath was removed.   We had to place two extra large  clips on the cystic duct as it was so wide rashes, no suspicious skin lesions noted  Surgical Incisions: well approximated, no drainage no erythema steri strips intact     Significant Diagnostics:   Radiology: Xr Abdomen (kub) (single Ap View)    Result Date: 7/17/2020  PROCEDURE: XR ABDOMEN (KUB) (SINGLE AP VIEW) CLINICAL INFORMATION: Right upper quadrant abdominal pain TECHNIQUE: 2 AP supine abdominal radiographs COMPARISON: Abdomen 7/1/2020 FINDINGS: Bowel gas pattern is nonobstructive. No abnormal masses or calcifications are seen. Osseous structures are unremarkable. Nonobstructive bowel gas pattern. Final report electronically signed by Dr. Keith Kirk on 7/17/2020 7:44 AM    Mri Abdomen W Wo Contrast Mrcp    Result Date: 7/17/2020  PROCEDURE: MRI ABDOMEN W WO CONTRAST MRCP CLINICAL INFORMATION: Gallstone pancreatitis. Elevated bilirubin COMPARISON: CT 7/2/2020 TECHNIQUE: Routine MRI abdomen without and with IV contrast.  Routine MRCP was also performed. The MRCP examination includes 3D reconstructions of the biliary tree and the pancreatic duct. CONTRAST: 20 cc ProHance FINDINGS: LIVER: The liver is normal size. The hepatic parenchymal signal is normal. There is no hepatic mass. There is no intrahepatic biliary dilatation. There is normal enhancement of the portal and the hepatic veins. There is normal enhancement of the portal venous confluence, SMV, and splenic vein. GALLBLADDER: The gallbladder has multiple large stones. No wall thickening or pericholecystic fluid. BILIARY TREE: The common duct is normal size measuring 6 mm There is no choledocholithiasis. PANCREAS: The pancreas is normal size and signal characteristics. There is normal pancreatic enhancement. There is no pancreatic mass. The pancreatic duct is normal size measuring 2 mm There is no pancreatic divisum. There is mild peripancreatic fluid or inflammation. SPLEEN:  No mass or enlargement. ADRENAL GLANDS: No mass or enlargement. KIDNEYS: No mass or obstruction.  BOWEL: Nonobstructive. FREE AIR/FREE FLUID/INFLAMMATION: None. LYMPHADENOPATHY: There are no pathologically enlarged lymph nodes. ABDOMINAL AORTA/IVC: Unremarkable. LUNG BASES: Unremarkable. MUSCULOSKELETAL: Unremarkable. OTHER: None. Multiple gallstones. No definite ductal dilatation. **This report has been created using voice recognition software. It may contain minor errors which are inherent in voice recognition technology. ** Final report electronically signed by Dr. Manjit Avalos on 7/17/2020 2:12 PM      Labs:   Recent Results (from the past 72 hour(s))   CBC auto differential    Collection Time: 07/18/20  7:24 AM   Result Value Ref Range    WBC 13.2 (H) 4.8 - 10.8 thou/mm3    RBC 5.15 4.70 - 6.10 mill/mm3    Hemoglobin 15.4 14.0 - 18.0 gm/dl    Hematocrit 47.5 42.0 - 52.0 %    MCV 92.2 80.0 - 94.0 fL    MCH 29.9 26.0 - 33.0 pg    MCHC 32.4 32.2 - 35.5 gm/dl    RDW-CV 14.0 11.5 - 14.5 %    RDW-SD 47.6 (H) 35.0 - 45.0 fL    Platelets 042 439 - 191 thou/mm3    MPV 9.5 9.4 - 12.4 fL    Seg Neutrophils 82.1 %    Lymphocytes 9.2 %    Monocytes 8.0 %    Eosinophils 0.1 %    Basophils 0.1 %    Immature Granulocytes 0.5 %    Segs Absolute 10.8 (H) 1.8 - 7.7 thou/mm3    Lymphocytes Absolute 1.2 1.0 - 4.8 thou/mm3    Monocytes Absolute 1.1 0.4 - 1.3 thou/mm3    Eosinophils Absolute 0.0 0.0 - 0.4 thou/mm3    Basophils Absolute 0.0 0.0 - 0.1 thou/mm3    Immature Grans (Abs) 0.07 0.00 - 0.07 thou/mm3    nRBC 0 /100 wbc   Amylase    Collection Time: 07/18/20  7:24 AM   Result Value Ref Range    Amylase 629 (H) 20 - 104 U/L   Lipase    Collection Time: 07/18/20  7:24 AM   Result Value Ref Range    Lipase 472.4 (H) 5.6 - 51.3 U/L   Hepatic Function Panel    Collection Time: 07/18/20  7:24 AM   Result Value Ref Range    Alb 3.6 3.5 - 5.1 g/dL    Total Bilirubin 2.3 (H) 0.3 - 1.2 mg/dL    Bilirubin, Direct 1.2 (H) 0.0 - 0.3 mg/dL    Alkaline Phosphatase 119 38 - 126 U/L    AST 60 (H) 5 - 40 U/L     (H) 11 - 66 U/L Total Protein 6.9 6.1 - 8.0 g/dL   COVID-19    Collection Time: 07/19/20 10:44 AM   Result Value Ref Range    SARS-CoV-2, PCR NOT DETECTED NOT DETECT   CBC    Collection Time: 07/20/20  6:57 AM   Result Value Ref Range    WBC 15.2 (H) 4.8 - 10.8 thou/mm3    RBC 4.44 (L) 4.70 - 6.10 mill/mm3    Hemoglobin 13.2 (L) 14.0 - 18.0 gm/dl    Hematocrit 41.4 (L) 42.0 - 52.0 %    MCV 93.2 80.0 - 94.0 fL    MCH 29.7 26.0 - 33.0 pg    MCHC 31.9 (L) 32.2 - 35.5 gm/dl    RDW-CV 13.4 11.5 - 14.5 %    RDW-SD 45.7 (H) 35.0 - 45.0 fL    Platelets 421 253 - 424 thou/mm3    MPV 9.7 9.4 - 12.4 fL   Hepatic Function Panel    Collection Time: 07/20/20  6:57 AM   Result Value Ref Range    Alb 3.2 (L) 3.5 - 5.1 g/dL    Total Bilirubin 1.1 0.3 - 1.2 mg/dL    Bilirubin, Direct 0.5 (H) 0.0 - 0.3 mg/dL    Alkaline Phosphatase 71 38 - 126 U/L    AST 36 5 - 40 U/L     (H) 11 - 66 U/L    Total Protein 6.4 6.1 - 8.0 g/dL   Lipase    Collection Time: 07/20/20  6:57 AM   Result Value Ref Range    Lipase 29.9 5.6 - 51.3 U/L       Patient Instructions:    DR. [facility]    Pt Name: Mackenzie Chao  Medical Record Number: [id number]  Today's Date: 7/20/2020    GENERAL ANESTHESIA OR SEDATION  1. Do not drive or operate hazardous machinery for 24 hours. 2. Do not make important business or personal decisions for 24 hours. 3. Do not drink alcoholic beverages or use tobacco for 24 hours. ACTIVITY INSTRUCTIONS:  Ambulate 4 times a day for approximately 10-15  minutes each time     You may resume normal activity tomorrow. Do not engage in strenuous activity that may place stress on your incision.     You may drive when no longer taking pain medication and you are able to comfortably use the gas/brake pedal. Do not drive long distance, in town driving recommended    avoid heavy lifting, tugging, pullings greater than 10-15 lbs for  weeks postoperatively, or until released by Physician/CNP      DIET INSTRUCTIONS:  Normal at home diet    MEDICATIONS  You may resume your daily prescription medication schedule unless otherwise specified. Pain medication at discharge - use only as prescribed- refills may be available to you at your follow up appointments if needed and warranted. Narcotics should be used for only short term and we highly encouraged our patients to wean off appropriately and use other means for pain such as non pharmacologic measures and over the counter tylenol or ibuprofen if no restrictions apply. We do  know that surgical pain is real and will not hesitate to help eliminate some of your discomfort. However we will not be able to completely make you pain free and it is important to determine what pain level is tolerable for you     Narcotics cause constipation and we recommend taking a colace daily and Miralax if needed to help reduce the risk of constipation    Increasing water intake if no restrictions will also help eliminate constipation    Ambulation 4 times a day 15 minute each time will help reduce pain each day and help relieve constipation      WOUND/DRESSING INSTRUCTIONS:  Always ensure you and your care giver clean hands before and after caring for the  wound. Keep dressing clean and dry, Change when soiled or wet. Leave incision open to air if not draining   Allow steri-strips to fall off on their own. Ice operative site for 20 minutes 4 times a day as needed     May wash over incision in shower daily,  but do not soak in a bath. ABDOMINAL/LAPAROSCOPIC SURGERY  [x]You are encouraged to get up and move around as this helps with the circulation and speeds up the healing process. [x]Breath deeply and cough from time to time. This helps to clear your lungs and helps prevent pneumonia. [x]Supporting your incision with a pillow or your hand helps to minimize discomfort and pain.   [x]Laparoscopic patients may develop shoulder pain in the first 48 hours from the gas used during the Electronically signed by BRADY Lynn CNP on 7/20/2020 at 1:04 PM

## 2020-07-20 NOTE — PROGRESS NOTES
Discharge teaching and instructions for diagnosis/procedure of lap choley completed with patient using teachback method. AVS reviewed. Patient given caring for your incision handout. Educated on Cholrhexadine rinse for incision care. Prescriptions available to patient at their local pharmacy. Patient voiced understanding regarding prescriptions, follow up appointments, and care of self at home. Discharged in a wheelchair to  home with support per family.

## 2020-07-20 NOTE — PROGRESS NOTES
Physician Progress Note      PATIENT:               Broderick Shock  CSN #:                  [id number]  :                       1995  ADMIT DATE:       2020 6:44 AM  DISCH DATE:  RESPONDING  PROVIDER #:        Javier Garcia CNP          QUERY TEXT:    Patient admitted with BMI 43.1. If possible, please document in progress notes   and discharge summary if you are evaluating and /or treating any of the   following: The medical record reflects the following:    Risk Factors: BMI 43.1  Clinical Indicators: BMI 43.1, Ht 5'10\" wt 300lb  Treatment: possible longer OR time needed, weight based medications if   required  Options provided:  -- Morbid obesity  -- Obesity  -- BMI not clinically significant  -- Other - I will add my own diagnosis  -- Dismiss - Clinically unable to determine / Unknown  -- Refer to Clinical Documentation Reviewer    PROVIDER RESPONSE TEXT:    This patient has morbid obesity.     Query created by: Denys Crawley on 2020 9:00 AM      Electronically signed by:  Javier Garcia CNP 2020 12:07 PM

## 2020-07-20 NOTE — CARE COORDINATION
20, 7:45 AM EDT  DISCHARGE PLANNING EVALUATION:    [address] [facility], [address]       Ad[facility] from: ER [facility] [address] [facility]: [facility]   Location: [facility]- Reason for admit: Gallstone pancreatitis [K85.10] Status: Inpt  Admit order signed?: yes  PMH:  has no past medical history on file. Procedure: 20 per Dr. Qamar Ochoa  Laparoscopic cholecystectomy with cholangiogram  Pertinent abnormal Imagin20 MRI Abd     Multiple gallstones. No definite ductal dilatation. Medications:  Scheduled Meds:   sodium chloride flush  10 mL Intravenous 2 times per day    piperacillin-tazobactam (ZOSYN) 3.375 g in dextrose 5% IVPB extended infusion (mini-bag)  3.375 g Intravenous Q8H     Continuous Infusions:   sodium chloride 125 mL/hr at 20 0936      Pertinent Info/Orders/Treatment Plan: To ER with abd pain. Gallstones. Gen Surg following. OR on 20. IVF at 125/hr. Percocet for pain. Zosyn q 8/hrs. Lipase normal. WBC';s today 15.2. Diet: DIET GENERAL;   Smoking status:  reports that he has never smoked. He has never used smokeless tobacco.   PCP: No primary care provider on file. Readmission 30 days or less: No  Readmission Risk Score: 7%    Discharge Planning Evaluation  Current Residence:  Private Residence  Living Arrangements:  Parent   Support Systems:  Parent  Current Services PTA:     Potential Assistance Needed:  N/A  Potential Assistance Purchasing Medications:  No  Does patient want to participate in local refill/ meds to beds program?  No  Type of Home Care Services:  None  Patient expects to be discharged to:  home with mother  Expected Discharge date: Follow Up Appointment: Best Day/ Time: Monday AM    Patient Goals/Plan/Treatment Preferences: Met with pt today. He is from home where he lives with his mother. He works but has been off for some time due to [address] [place]. He has no home services or DME. He drives and basic needs are met.  He does not have a PCP but would prefer a [facility] ([facility])

## 2020-07-27 ENCOUNTER — OFFICE VISIT (OUTPATIENT)
Dept: FAMILY MEDICINE CLINIC | Age: 25
End: 2020-07-27
Payer: MEDICAID

## 2020-07-27 VITALS
TEMPERATURE: 96.8 F | SYSTOLIC BLOOD PRESSURE: 122 MMHG | WEIGHT: 306.8 LBS | DIASTOLIC BLOOD PRESSURE: 80 MMHG | OXYGEN SATURATION: 96 % | HEART RATE: 93 BPM | HEIGHT: 69 IN | RESPIRATION RATE: 18 BRPM | BODY MASS INDEX: 45.44 KG/M2

## 2020-07-27 PROCEDURE — 99213 OFFICE O/P EST LOW 20 MIN: CPT | Performed by: STUDENT IN AN ORGANIZED HEALTH CARE EDUCATION/TRAINING PROGRAM

## 2020-07-27 SDOH — ECONOMIC STABILITY: TRANSPORTATION INSECURITY
IN THE PAST 12 MONTHS, HAS THE LACK OF TRANSPORTATION KEPT YOU FROM MEDICAL APPOINTMENTS OR FROM GETTING MEDICATIONS?: NO

## 2020-07-27 SDOH — ECONOMIC STABILITY: TRANSPORTATION INSECURITY
IN THE PAST 12 MONTHS, HAS LACK OF TRANSPORTATION KEPT YOU FROM MEETINGS, WORK, OR FROM GETTING THINGS NEEDED FOR DAILY LIVING?: NO

## 2020-07-27 SDOH — ECONOMIC STABILITY: FOOD INSECURITY: WITHIN THE PAST 12 MONTHS, THE FOOD YOU BOUGHT JUST DIDN'T LAST AND YOU DIDN'T HAVE MONEY TO GET MORE.: NEVER TRUE

## 2020-07-27 SDOH — ECONOMIC STABILITY: FOOD INSECURITY: WITHIN THE PAST 12 MONTHS, YOU WORRIED THAT YOUR FOOD WOULD RUN OUT BEFORE YOU GOT MONEY TO BUY MORE.: NEVER TRUE

## 2020-07-27 SDOH — ECONOMIC STABILITY: INCOME INSECURITY: HOW HARD IS IT FOR YOU TO PAY FOR THE VERY BASICS LIKE FOOD, HOUSING, MEDICAL CARE, AND HEATING?: SOMEWHAT HARD

## 2020-07-27 ASSESSMENT — ENCOUNTER SYMPTOMS
RHINORRHEA: 0
SINUS PAIN: 0
BLOOD IN STOOL: 0
SHORTNESS OF BREATH: 0
ABDOMINAL PAIN: 0
EYE PAIN: 0
DIARRHEA: 0
COUGH: 0
NAUSEA: 0
CONSTIPATION: 0
SINUS PRESSURE: 0
SORE THROAT: 0
VOMITING: 0

## 2020-07-27 ASSESSMENT — PATIENT HEALTH QUESTIONNAIRE - PHQ9
2. FEELING DOWN, DEPRESSED OR HOPELESS: 0
SUM OF ALL RESPONSES TO PHQ9 QUESTIONS 1 & 2: 0
SUM OF ALL RESPONSES TO PHQ QUESTIONS 1-9: 0
1. LITTLE INTEREST OR PLEASURE IN DOING THINGS: 0
SUM OF ALL RESPONSES TO PHQ QUESTIONS 1-9: 0

## 2020-07-27 NOTE — PROGRESS NOTES
Mackenzie Chao is a 27 y.o. male who presents today for:  Chief Complaint   Patient presents with   Beth Parrish New Doctor    Follow Up After Procedure       Goals    None         HPI:     HPI   Wei Waters presents to clinic today to establish care and for follow-up following a laparoscopic cholecystectomy with Dr. Jorge Parsons on 7/19/2020. Patient reports that he is not experiencing any abdominal pain. Wei Waters states that he had some discomfort for a few days following his surgery, but that is resolved. He states that he takes 800 mg of ibuprofen in the morning for some discomfort. He states that his incisions are healing well and the largest incision and is only given for mild discomfort. Wei Waters states that eating is going well. He denies any nausea, vomiting, constipation, or diarrhea. Wei Waters states that he cooked an omelette this morning for breakfast with miguel and assorted vegetables. No other concerns today  No question data found. History reviewed. No pertinent past medical history. Past Surgical History:   Procedure Laterality Date    CHOLECYSTECTOMY, LAPAROSCOPIC N/A 7/19/2020    CHOLECYSTECTOMY LAPAROSCOPIC, cholangiogram performed by Mirta Mendieta MD at [address] [place] [address] History   Problem Relation Age of Onset    No Known Problems Mother     No Known Problems Father      Social History     Tobacco Use    Smoking status: Never Smoker    Smokeless tobacco: Never Used   Substance Use Topics    Alcohol use: Yes     Comment: social      Current Outpatient Medications   Medication Sig Dispense Refill    ibuprofen (ADVIL;MOTRIN) 800 MG tablet Take 1 tablet by mouth every 6 hours as needed for Pain 28 tablet 0    oxyCODONE-acetaminophen (PERCOCET) 5-325 MG per tablet Take 1 tablet by mouth every 6 hours as needed (Moderate pain, pain scale 4-6) for up to 7 days.  (Patient not taking: Reported on 7/27/2020) 10 tablet 0     No current facility-administered medications for this visit. No Known Allergies    Health Maintenance   Topic Date Due    Varicella vaccine (1 of 2 - 2-dose childhood series) 10/04/1998    HPV vaccine (1 - Male 2-dose series) 10/04/2008    HIV screen  10/04/2012    DTaP/Tdap/Td vaccine (1 - Tdap) 10/04/2016    Flu vaccine (1) 09/01/2020    Hepatitis A vaccine  Aged Out    Hepatitis B vaccine  Aged Out    Hib vaccine  Aged Out    Meningococcal (ACWY) vaccine  Aged Out    Pneumococcal 0-64 years Vaccine  Aged Out       Subjective:      Review of Systems   Constitutional: Negative for chills, fatigue and fever. HENT: Negative for congestion, ear pain, rhinorrhea, sinus pressure, sinus pain and sore throat. Eyes: Negative for pain and visual disturbance. Respiratory: Negative for cough and shortness of breath. Cardiovascular: Negative for chest pain and palpitations. Gastrointestinal: Negative for abdominal pain, blood in stool, constipation, diarrhea, nausea and vomiting. Genitourinary: Negative for difficulty urinating and dysuria. Musculoskeletal: Negative for arthralgias and myalgias. Skin: Negative for rash. Neurological: Negative for dizziness, weakness, light-headedness, numbness and headaches. Psychiatric/Behavioral: Negative for dysphoric mood and sleep disturbance. The patient is not nervous/anxious. Objective:     Vitals:    07/27/20 1333   BP: 122/80   Site: Right Upper Arm   Pulse: 93   Resp: 18   Temp: 96.8 °F (36 °C)   TempSrc: Temporal   SpO2: 96%   Weight: (!) 306 lb 12.8 oz (139.2 kg)   Height: 5' 9.49\" (1.765 m)       Body mass index is 44.67 kg/m². Wt Readings from Last 3 Encounters:   07/27/20 (!) 306 lb 12.8 oz (139.2 kg)   07/17/20 300 lb (136.1 kg)   07/14/20 (!) 309 lb (140.2 kg)     BP Readings from Last 3 Encounters:   07/27/20 122/80   07/20/20 126/79   07/19/20 120/62       Physical Exam  Vitals signs and nursing note reviewed. Constitutional:       General: He is awake.  He is not in acute distress. Appearance: Normal appearance. He is morbidly obese. He is not ill-appearing. HENT:      Head: Normocephalic and atraumatic. Right Ear: External ear normal.      Left Ear: External ear normal.      Nose: Nose normal. No congestion or rhinorrhea. Mouth/Throat:      Mouth: Mucous membranes are moist.      Pharynx: Oropharynx is clear. No oropharyngeal exudate or posterior oropharyngeal erythema. Eyes:      General: No scleral icterus. Right eye: No discharge. Left eye: No discharge. Extraocular Movements: Extraocular movements intact. Conjunctiva/sclera: Conjunctivae normal.      Pupils: Pupils are equal, round, and reactive to light. Neck:      Musculoskeletal: Normal range of motion and neck supple. Cardiovascular:      Rate and Rhythm: Normal rate and regular rhythm. Pulses: Normal pulses. Heart sounds: Normal heart sounds. No murmur. Pulmonary:      Effort: Pulmonary effort is normal. No respiratory distress. Breath sounds: Normal breath sounds. No wheezing, rhonchi or rales. Abdominal:      General: Abdomen is flat. Bowel sounds are normal. There is no distension. Palpations: Abdomen is soft. Tenderness: There is no abdominal tenderness. Comments: Patient has multiple healing surgical incisions from laparoscopic cholecystectomy. All incisions are clean dry and intact. Steri-Strips covering all incisions. Musculoskeletal: Normal range of motion. General: No signs of injury. Right lower leg: No edema. Left lower leg: No edema. Skin:     General: Skin is warm and dry. Capillary Refill: Capillary refill takes less than 2 seconds. Findings: No erythema or rash. Neurological:      General: No focal deficit present. Mental Status: He is alert and oriented to person, place, and time. Mental status is at baseline. Cranial Nerves: No cranial nerve deficit. Motor: No weakness. CT images were obtained through the abdomen and pelvis after the administration of intravenous and oral contrast. Coronal and sagittal reconstructions were obtained. All CT scans at this facility use dose modulation, iterative reconstruction, and/or weight-based dosing when appropriate to reduce radiation dose to as low as reasonably achievable. FINDINGS:  The lung bases and cardiac chambers are grossly negative. There is diffuse fatty change of the liver. Multiple gallstones are present. Mild gallbladder wall thickening and minimal pericholecystic fluid is present correlation with superimposed cholecystitis. The pancreas and spleen are negative. The adrenal glands and bilateral kidneys are within normal limits. There is no evidence of acute bowel obstruction. There is slight thickening of the proximal ascending colon over a several centimeter segment with nondistention of the lumen. Changes of colitis are considered for this appearance and require further correlation. The appendix is normal. The lower pelvic recess is negative for active pathology. The prostate and urinary bladder are normal. The skeleton and the vascular structures are intact. . 1. Slightly thick-walled segment of the proximal ascending colon. Correlate with changes of colitis without luminal distention. 2. Cholelithiasis. Mild gallbladder wall thickening noted with pericholecystic fluid. Further correlation with clinical exam recommended to further assess possible cholecystitis. **This report has been created using voice recognition software. It may contain minor errors which are inherent in voice recognition technology. ** Final report electronically signed by Dr. Roland Sicard on 7/2/2020 1:09 AM    Mri Abdomen W Wo Contrast Mrcp    Result Date: 7/17/2020  PROCEDURE: MRI ABDOMEN W WO CONTRAST MRCP CLINICAL INFORMATION: Gallstone pancreatitis.   Elevated bilirubin COMPARISON: CT 7/2/2020 TECHNIQUE: Routine MRI abdomen without and with IV contrast.  Routine MRCP was also performed. The MRCP examination includes 3D reconstructions of the biliary tree and the pancreatic duct. CONTRAST: 20 cc ProHance FINDINGS: LIVER: The liver is normal size. The hepatic parenchymal signal is normal. There is no hepatic mass. There is no intrahepatic biliary dilatation. There is normal enhancement of the portal and the hepatic veins. There is normal enhancement of the portal venous confluence, SMV, and splenic vein. GALLBLADDER: The gallbladder has multiple large stones. No wall thickening or pericholecystic fluid. BILIARY TREE: The common duct is normal size measuring 6 mm There is no choledocholithiasis. PANCREAS: The pancreas is normal size and signal characteristics. There is normal pancreatic enhancement. There is no pancreatic mass. The pancreatic duct is normal size measuring 2 mm There is no pancreatic divisum. There is mild peripancreatic fluid or inflammation. SPLEEN:  No mass or enlargement. ADRENAL GLANDS: No mass or enlargement. KIDNEYS: No mass or obstruction. BOWEL: Nonobstructive. FREE AIR/FREE FLUID/INFLAMMATION: None. LYMPHADENOPATHY: There are no pathologically enlarged lymph nodes. ABDOMINAL AORTA/IVC: Unremarkable. LUNG BASES: Unremarkable. MUSCULOSKELETAL: Unremarkable. OTHER: None. Multiple gallstones. No definite ductal dilatation. **This report has been created using voice recognition software. It may contain minor errors which are inherent in voice recognition technology. ** Final report electronically signed by Dr. Erma Beckwith on 7/17/2020 2:12 PM    Fl Cholangiogram Or    Result Date: 7/19/2020  PROCEDURE: FL CHOLANGIOGRAM OR CLINICAL INFORMATION: cholecystectmy COMPARISON: No prior study. TECHNIQUE:  Intraoperative Lindegren 2 images  FINDINGS: 2 images from an intraoperative plantar gram performed. There is cannulation of the cystic duct. There is opacification of the biliary tree which appears patent.  There is emptying of contrast into the duodenum. 1. Intraoperative cholangiogram demonstrates cannulation of the cystic duct with filling of the biliary tree which appears patent where there is emptying of contrast into the duodenum. Please refer to operative report for further details. **This report has been created using voice recognition software. It may contain minor errors which are inherent in voice recognition technology. ** Final report electronically signed by Dr. Brittany Palacios on 7/19/2020 2:58 PM        Assessment / Plan:     1. Encounter to establish care with new doctor  Wei Waters is doing well today. He has no complaints. He is recovering well from his surgery. We will get basic labs CBC, CMP, lipid panel, TSH, hemoglobin A1c and will see in clinic for wellness exam in 3 months or earlier as needed    2. S/P laparoscopic cholecystectomy  Had a laparoscopic cholecystectomy on July 19 with Dr. Jorge Parsons. He has scheduled follow-up with surgery soon. He was advised to keep his follow-up appointment. It is well controlled on ibuprofen. - Comprehensive Metabolic Panel; Future  - CBC Auto Differential; Future    3. Morbid obesity ([address]  Briefly discussed diet and exercise today. We will go over this in more detail at annual wellness exam in 3 months. - Lipid Panel; Future  - TSH with Reflex; Future  - Hemoglobin A1C; Future    4. Screening for hyperlipidemia  - Lipid Panel; Future    5. Encounter for screening for HIV  - HIV Screen; Future       Return in about 3 months (around 10/27/2020) for Wellness. Medications Prescribed:  No orders of the defined types were placed in this encounter. Future Appointments   Date Time Provider Tanner Dukes   7/30/2020 11:45 AM BRADY Pritchett - CNP Adv [facility] [address]   11/2/2020  1:30 PM Alvarado Caro MD [facility] [address]       Patient given educational materials - see patient instructions. Discussed use, benefit, and side effects of prescribed medications. All patient questions answered. Patient voiced understanding. Reviewed health maintenance. Instructed to continue current medications, diet and exercise. Patient agreed with treatment plan. Follow up as directed.      Electronically signed by Nolan Viramontes MD on 7/27/2020 at 2:12 PM

## 2020-07-27 NOTE — PROGRESS NOTES
S: 27 y.o. male with   Chief Complaint   Patient presents with    Established New Doctor    Follow Up After Procedure       Hd gallbladder out - abd sore - only taking motrin in the am        BP Readings from Last 3 Encounters:   07/27/20 122/80   07/20/20 126/79   07/19/20 120/62     Wt Readings from Last 3 Encounters:   07/27/20 (!) 306 lb 12.8 oz (139.2 kg)   07/17/20 300 lb (136.1 kg)   07/14/20 (!) 309 lb (140.2 kg)           O: VS:   Vitals:    07/27/20 1333   BP: 122/80   Site: Right Upper Arm   Pulse: 93   Resp: 18   Temp: 96.8 °F (36 °C)   TempSrc: Temporal   SpO2: 96%   Weight: (!) 306 lb 12.8 oz (139.2 kg)   Height: 5' 9.49\" (1.765 m)     Body mass index is 44.67 kg/m². Incisions healing well    Lab Results   Component Value Date    WBC 15.2 (H) 07/20/2020    HGB 13.2 (L) 07/20/2020    HCT 41.4 (L) 07/20/2020     07/20/2020    AST 36 07/20/2020     07/17/2020    K 3.7 07/17/2020     07/17/2020    CREATININE 0.9 07/17/2020    BUN 9 07/17/2020    CO2 25 07/17/2020    LABGLOM >90 07/17/2020    MG 2.2 07/17/2020    CALCIUM 10.3 07/17/2020       Xr Abdomen (kub) (single Ap View)    Result Date: 7/17/2020  PROCEDURE: XR ABDOMEN (KUB) (SINGLE AP VIEW) CLINICAL INFORMATION: Right upper quadrant abdominal pain TECHNIQUE: 2 AP supine abdominal radiographs COMPARISON: Abdomen 7/1/2020 FINDINGS: Bowel gas pattern is nonobstructive. No abnormal masses or calcifications are seen. Osseous structures are unremarkable. Nonobstructive bowel gas pattern. Final report electronically signed by Dr. Dina Chino on 7/17/2020 7:44 AM    Xr Abdomen (kub) (single Ap View)    Result Date: 7/1/2020  PROCEDURE: XR ABDOMEN (KUB) (SINGLE AP VIEW) CLINICAL INFORMATION: possible constipation. COMPARISON: No prior study. TECHNIQUE: A supine AP view of the abdomen was obtained. FINDINGS: There are no distended bowel loops. There are no displaced bowel loops. There is no gross free air.  No suspicious densities are present. No suspicious osseous lesions are present. No evidence of distended bowel loops. **This report has been created using voice recognition software. It may contain minor errors which are inherent in voice recognition technology. ** Final report electronically signed by Dr. Sissy Weiss on 7/1/2020 11:37 PM    Ct Abdomen Pelvis W Iv Contrast Additional Contrast? None    Result Date: 7/2/2020  PROCEDURE: CT ABDOMEN PELVIS W IV CONTRAST CLINICAL INFORMATION: RLQ Pain . COMPARISON: None. TECHNIQUE: 5 mm axial CT images were obtained through the abdomen and pelvis after the administration of intravenous and oral contrast. Coronal and sagittal reconstructions were obtained. All CT scans at this facility use dose modulation, iterative reconstruction, and/or weight-based dosing when appropriate to reduce radiation dose to as low as reasonably achievable. FINDINGS:  The lung bases and cardiac chambers are grossly negative. There is diffuse fatty change of the liver. Multiple gallstones are present. Mild gallbladder wall thickening and minimal pericholecystic fluid is present correlation with superimposed cholecystitis. The pancreas and spleen are negative. The adrenal glands and bilateral kidneys are within normal limits. There is no evidence of acute bowel obstruction. There is slight thickening of the proximal ascending colon over a several centimeter segment with nondistention of the lumen. Changes of colitis are considered for this appearance and require further correlation. The appendix is normal. The lower pelvic recess is negative for active pathology. The prostate and urinary bladder are normal. The skeleton and the vascular structures are intact. . 1. Slightly thick-walled segment of the proximal ascending colon. Correlate with changes of colitis without luminal distention. 2. Cholelithiasis. Mild gallbladder wall thickening noted with pericholecystic fluid.  Further correlation with clinical exam recommended to further assess possible cholecystitis. **This report has been created using voice recognition software. It may contain minor errors which are inherent in voice recognition technology. ** Final report electronically signed by Dr. Vinny David on 7/2/2020 1:09 AM    Mri Abdomen W Wo Contrast Mrcp    Result Date: 7/17/2020  PROCEDURE: MRI ABDOMEN W WO CONTRAST MRCP CLINICAL INFORMATION: Gallstone pancreatitis. Elevated bilirubin COMPARISON: CT 7/2/2020 TECHNIQUE: Routine MRI abdomen without and with IV contrast.  Routine MRCP was also performed. The MRCP examination includes 3D reconstructions of the biliary tree and the pancreatic duct. CONTRAST: 20 cc ProHance FINDINGS: LIVER: The liver is normal size. The hepatic parenchymal signal is normal. There is no hepatic mass. There is no intrahepatic biliary dilatation. There is normal enhancement of the portal and the hepatic veins. There is normal enhancement of the portal venous confluence, SMV, and splenic vein. GALLBLADDER: The gallbladder has multiple large stones. No wall thickening or pericholecystic fluid. BILIARY TREE: The common duct is normal size measuring 6 mm There is no choledocholithiasis. PANCREAS: The pancreas is normal size and signal characteristics. There is normal pancreatic enhancement. There is no pancreatic mass. The pancreatic duct is normal size measuring 2 mm There is no pancreatic divisum. There is mild peripancreatic fluid or inflammation. SPLEEN:  No mass or enlargement. ADRENAL GLANDS: No mass or enlargement. KIDNEYS: No mass or obstruction. BOWEL: Nonobstructive. FREE AIR/FREE FLUID/INFLAMMATION: None. LYMPHADENOPATHY: There are no pathologically enlarged lymph nodes. ABDOMINAL AORTA/IVC: Unremarkable. LUNG BASES: Unremarkable. MUSCULOSKELETAL: Unremarkable. OTHER: None. Multiple gallstones. No definite ductal dilatation. **This report has been created using voice recognition software.   It may contain minor errors which are inherent in voice recognition technology. ** Final report electronically signed by Dr. Ivan Vasquez on 7/17/2020 2:12 PM    Fl Cholangiogram Or    Result Date: 7/19/2020  PROCEDURE: FL CHOLANGIOGRAM OR CLINICAL INFORMATION: cholecystectmy COMPARISON: No prior study. TECHNIQUE:  Intraoperative Lindegren 2 images  FINDINGS: 2 images from an intraoperative plantar gram performed. There is cannulation of the cystic duct. There is opacification of the biliary tree which appears patent. There is emptying of contrast into the duodenum. 1. Intraoperative cholangiogram demonstrates cannulation of the cystic duct with filling of the biliary tree which appears patent where there is emptying of contrast into the duodenum. Please refer to operative report for further details. **This report has been created using voice recognition software. It may contain minor errors which are inherent in voice recognition technology. ** Final report electronically signed by Dr. Amber Tucker on 7/19/2020 2:58 PM           Diagnosis Orders   1. Encounter to establish care with new doctor     2. S/P laparoscopic cholecystectomy     3. Morbid obesity ([address]         Plan  Will see him back in 3 months - will do some baseline labs    A1c, lipids, thyroid,cbc    Discussed diet and exercise - will discuss more at the next visit once well healed from the surgery       Return in about 3 months (around 10/27/2020) for Wellness. Orders Placed:  No orders of the defined types were placed in this encounter. Medications Prescribed:  No orders of the defined types were placed in this encounter.       Future Appointments   Date Time Provider Tanner Dukes   7/30/2020 11:45 AM Carlene Long APRN - CNP Adv Surg [address] Maintenance Due   Topic Date Due    Varicella vaccine (1 of 2 - 2-dose childhood series) 10/04/1998    HPV vaccine (1 - Male 2-dose series) 10/04/2008    HIV screen  10/04/2012    DTaP/Tdap/Td vaccine (1 - Tdap) 10/04/2016         Attending Physician Statement  I have discussed the case, including pertinent history and exam findings with the resident. [id number] agree with the documented assessment and plan as documented by the resident.   GE modifier added to this encounter      Huyen Eason DO 7/27/2020 1:57 PM

## 2022-09-26 ENCOUNTER — HOSPITAL ENCOUNTER (EMERGENCY)
Age: 27
Discharge: HOME | End: 2022-09-26
Payer: COMMERCIAL

## 2022-09-26 VITALS
HEART RATE: 110 BPM | RESPIRATION RATE: 14 BRPM | DIASTOLIC BLOOD PRESSURE: 87 MMHG | OXYGEN SATURATION: 95 % | TEMPERATURE: 97 F | BODY MASS INDEX: 42.95 KG/M2 | HEIGHT: 70 IN | WEIGHT: 300 LBS | SYSTOLIC BLOOD PRESSURE: 134 MMHG

## 2022-09-26 DIAGNOSIS — R68.89 FLU-LIKE SYMPTOMS: Primary | ICD-10-CM

## 2022-09-26 DIAGNOSIS — Z20.822 COVID-19 RULED OUT: ICD-10-CM

## 2022-09-26 LAB — SARS-COV-2, NAA: NOT  DETECTED

## 2022-09-26 PROCEDURE — 87635 SARS-COV-2 COVID-19 AMP PRB: CPT

## 2022-09-26 PROCEDURE — 99212 OFFICE O/P EST SF 10 MIN: CPT | Performed by: NURSE PRACTITIONER

## 2022-09-26 PROCEDURE — 99213 OFFICE O/P EST LOW 20 MIN: CPT

## 2022-09-26 RX ORDER — AZELASTINE 1 MG/ML
1 SPRAY, METERED NASAL 2 TIMES DAILY
Qty: 30 ML | Refills: 0 | Status: SHIPPED | OUTPATIENT
Start: 2022-09-26

## 2022-09-26 RX ORDER — CETIRIZINE HYDROCHLORIDE 10 MG/1
10 TABLET ORAL DAILY
Qty: 30 TABLET | Refills: 0 | Status: SHIPPED | OUTPATIENT
Start: 2022-09-26 | End: 2022-10-26

## 2022-09-26 ASSESSMENT — ENCOUNTER SYMPTOMS
RHINORRHEA: 1
WHEEZING: 0
DIARRHEA: 0
ABDOMINAL PAIN: 0
CHOKING: 0
CHEST TIGHTNESS: 0
NAUSEA: 0
STRIDOR: 0
SORE THROAT: 0
APNEA: 0
VOMITING: 0
FLU SYMPTOMS: 1
COUGH: 1
SHORTNESS OF BREATH: 0

## 2022-09-26 ASSESSMENT — PAIN SCALES - GENERAL: PAINLEVEL_OUTOF10: 4

## 2022-09-26 ASSESSMENT — PAIN - FUNCTIONAL ASSESSMENT: PAIN_FUNCTIONAL_ASSESSMENT: 0-10

## 2022-09-26 ASSESSMENT — PAIN DESCRIPTION - LOCATION: LOCATION: THROAT

## 2022-09-26 NOTE — Clinical Note
Mg Jerez was seen and treated in our emergency department on 9/26/2022. He may return to work on 09/28/2022. If you have any questions or concerns, please don't hesitate to call.       Ramez Ramirez, APRN - CNP

## 2022-09-26 NOTE — ED TRIAGE NOTES
To room 1 c/o cough congestions ore throat body aches  misssed 3rd shift work yesterday - need work note - \" Need 3 days for [employer].  Explained we dont cover 3 days off work Info for [employer] given

## 2022-09-26 NOTE — ED PROVIDER NOTES
[place] [id number]  Urgent Care Encounter      CHIEF COMPLAINT       Chief Complaint   Patient presents with    Cough     Congestion, body aches  sore throat       Nurses Notes reviewed and I agree except as noted in the HPI. HISTORY OFPRESENT ILLNESS   Walter Diaz is a 27 y.o. The history is provided by the patient. No  was used. Influenza  Presenting symptoms: cough, fatigue, headache, myalgias and rhinorrhea    Presenting symptoms: no diarrhea, no fever, no nausea, no shortness of breath, no sore throat and no vomiting    Severity:  Moderate  Onset quality:  Sudden  Duration:  16 hours  Progression:  Waxing and waning  Chronicity:  New  Relieved by:  Nothing  Worsened by:  Nothing  Ineffective treatments:  OTC medications  Associated symptoms: chills and nasal congestion    Associated symptoms: no decreased appetite, no decrease in physical activity, no ear pain, no mental status change, no neck stiffness and no syncope    Risk factors: not elderly, no diabetes problem, no heart disease, no immunocompromised state, no kidney disease, no liver disease, not pregnant and no sick contacts      REVIEW OF SYSTEMS     Review of Systems   Constitutional:  Positive for activity change, chills and fatigue. Negative for appetite change, decreased appetite, diaphoresis and fever. HENT:  Positive for congestion and rhinorrhea. Negative for ear pain and sore throat. Respiratory:  Positive for cough. Negative for apnea, choking, chest tightness, shortness of breath, wheezing and stridor. Cardiovascular:  Negative for chest pain, palpitations and leg swelling. Gastrointestinal:  Negative for abdominal pain, diarrhea, nausea and vomiting. Musculoskeletal:  Positive for myalgias. Negative for neck stiffness. Neurological:  Positive for headaches. Negative for dizziness and light-headedness. PAST MEDICAL HISTORY   History reviewed.  No pertinent past medical history. SURGICAL HISTORY     Patient  has a past surgical history that includes Cholecystectomy, laparoscopic (N/A, 7/19/2020). CURRENT MEDICATIONS       Discharge Medication List as of 9/26/2022  2:08 PM        CONTINUE these medications which have NOT CHANGED    Details   ibuprofen (ADVIL;MOTRIN) 800 MG tablet Take 1 tablet by mouth every 6 hours as needed for Pain, Disp-28 tablet,R-0Normal             ALLERGIES     Patient is has No Known Allergies. FAMILY HISTORY     Patient's family history includes No Known Problems in his father and mother. SOCIAL HISTORY     Patient  reports that he has never smoked. He has never used smokeless tobacco. He reports that he does not currently use alcohol. He reports that he does not use drugs. PHYSICAL EXAM     ED TRIAGE VITALS  BP: 134/87, Temp: 97 °F (36.1 °C), Heart Rate: (!) 110, Resp: 14, SpO2: 95 %  Physical Exam  Vitals and nursing note reviewed. Constitutional:       General: He is awake. He is not in acute distress. Appearance: Normal appearance. He is well-developed and well-groomed. He is obese. He is not ill-appearing, toxic-appearing or diaphoretic. HENT:      Head: Normocephalic and atraumatic. Right Ear: External ear normal.      Left Ear: External ear normal.      Nose: Nose normal.      Mouth/Throat:      Mouth: Mucous membranes are moist.   Eyes:      Extraocular Movements: Extraocular movements intact. Conjunctiva/sclera: Conjunctivae normal.   Pulmonary:      Effort: Pulmonary effort is normal. No respiratory distress. Breath sounds: Normal breath sounds. No stridor. No wheezing, rhonchi or rales. Chest:      Chest wall: No tenderness. Musculoskeletal:         General: Normal range of motion. Cervical back: Normal range of motion. Skin:     General: Skin is warm. Neurological:      General: No focal deficit present. Mental Status: He is alert and oriented to person, place, and time.    Psychiatric: Mood and Affect: Mood normal.         Behavior: Behavior normal. Behavior is cooperative. Thought Content: Thought content normal.         Judgment: Judgment normal.       DIAGNOSTIC RESULTS   Labs:  Results for orders placed or performed during the hospital encounter of 09/26/22   COVID-19, Rapid   Result Value Ref Range    SARS-CoV-2, CIELO NOT  DETECTED NOT DETECTED       IMAGING:  No orders to display     URGENT CARE COURSE:     Vitals:    09/26/22 1342   BP: 134/87   Pulse: (!) 110   Resp: 14   Temp: 97 °F (36.1 °C)   SpO2: 95%   Weight: 300 lb (136.1 kg)   Height: 5' 10\" (1.778 m)       Medications - No data to display  PROCEDURES:  None  FINAL IMPRESSION      1. Flu-like symptoms    2. COVID-19 ruled out        DISPOSITION/PLAN   Decision To Discharge     The patient was advised to drink plenty of fluids. They may take Tylenol for fever or body aches. Take prescribed medication as directed. They may also take OTC antihistamines/ decongestant as needed. Pt is advised to go to ER if symptoms worsen, new symptoms develop, high fever >102, vomiting, breathing difficulty, lethargy, chest pain or shortness of breath Dial 911. The patient or patient's representative is agreeable to the treatment plan they're advised to follow-up with her primary care provider in one week for reevaluation.      PATIENT REFERRED TO:  [address] [place] [id number]  Call today  [address] [facility]  [address] [phone] [facility] [phone]  Go today  If symptoms worsen  DISCHARGE MEDICATIONS:  Discharge Medication List as of 9/26/2022  2:08 PM        START taking these medications    Details   cetirizine (ZYRTEC ALLERGY) 10 MG tablet Take 1 tablet by mouth daily, Disp-30 tablet, R-0Normal      azelastine (ASTELIN) 0.1 % nasal spray 1 spray by Nasal route 2 times daily Use in each nostril as directed, Disp-30 mL, R-0Normal           Discharge Medication List as of 9/26/2022  2:08 PM          Franco Bowman, BRADY Bowman, BRADY Garcia CNP  09/26/22 5824

## 2024-01-02 ENCOUNTER — HOSPITAL ENCOUNTER (EMERGENCY)
Age: 29
Discharge: HOME | End: 2024-01-02
Attending: STUDENT IN AN ORGANIZED HEALTH CARE EDUCATION/TRAINING PROGRAM
Payer: COMMERCIAL

## 2024-01-02 ENCOUNTER — APPOINTMENT (OUTPATIENT)
Dept: GENERAL RADIOLOGY | Age: 29
End: 2024-01-02
Payer: COMMERCIAL

## 2024-01-02 VITALS
HEIGHT: 70 IN | DIASTOLIC BLOOD PRESSURE: 88 MMHG | WEIGHT: 315 LBS | OXYGEN SATURATION: 96 % | HEART RATE: 89 BPM | SYSTOLIC BLOOD PRESSURE: 131 MMHG | BODY MASS INDEX: 45.1 KG/M2 | RESPIRATION RATE: 20 BRPM | TEMPERATURE: 98.8 F

## 2024-01-02 DIAGNOSIS — J10.1 INFLUENZA A: Primary | ICD-10-CM

## 2024-01-02 LAB
ALBUMIN SERPL BCG-MCNC: 4.1 G/DL (ref 3.5–5.1)
ALP SERPL-CCNC: 81 U/L (ref 38–126)
ALT SERPL W/O P-5'-P-CCNC: 131 U/L (ref 11–66)
ANION GAP SERPL CALC-SCNC: 10 MEQ/L (ref 8–16)
AST SERPL-CCNC: 101 U/L (ref 5–40)
BASOPHILS ABSOLUTE: 0 THOU/MM3 (ref 0–0.1)
BASOPHILS NFR BLD AUTO: 0.1 %
BILIRUB SERPL-MCNC: 0.7 MG/DL (ref 0.3–1.2)
BUN SERPL-MCNC: 11 MG/DL (ref 7–22)
CALCIUM SERPL-MCNC: 8.8 MG/DL (ref 8.5–10.5)
CHLORIDE SERPL-SCNC: 102 MEQ/L (ref 98–111)
CO2 SERPL-SCNC: 26 MEQ/L (ref 23–33)
CREAT SERPL-MCNC: 0.9 MG/DL (ref 0.4–1.2)
DEPRECATED RDW RBC AUTO: 41.3 FL (ref 35–45)
EOSINOPHIL NFR BLD AUTO: 2.3 %
EOSINOPHILS ABSOLUTE: 0.2 THOU/MM3 (ref 0–0.4)
ERYTHROCYTE [DISTWIDTH] IN BLOOD BY AUTOMATED COUNT: 13.2 % (ref 11.5–14.5)
FLUAV RNA RESP QL NAA+PROBE: DETECTED
FLUBV RNA RESP QL NAA+PROBE: NOT DETECTED
GFR SERPL CREATININE-BSD FRML MDRD: > 60 ML/MIN/1.73M2
GLUCOSE SERPL-MCNC: 100 MG/DL (ref 70–108)
HCT VFR BLD AUTO: 47.6 % (ref 42–52)
HGB BLD-MCNC: 16.1 GM/DL (ref 14–18)
IMM GRANULOCYTES # BLD AUTO: 0.02 THOU/MM3 (ref 0–0.07)
IMM GRANULOCYTES NFR BLD AUTO: 0.3 %
LYMPHOCYTES ABSOLUTE: 0.9 THOU/MM3 (ref 1–4.8)
LYMPHOCYTES NFR BLD AUTO: 12 %
MCH RBC QN AUTO: 29.2 PG (ref 26–33)
MCHC RBC AUTO-ENTMCNC: 33.8 GM/DL (ref 32.2–35.5)
MCV RBC AUTO: 86.2 FL (ref 80–94)
MONOCYTES ABSOLUTE: 0.6 THOU/MM3 (ref 0.4–1.3)
MONOCYTES NFR BLD AUTO: 7.9 %
NEUTROPHILS NFR BLD AUTO: 77.4 %
NRBC BLD AUTO-RTO: 0 /100 WBC
OSMOLALITY SERPL CALC.SUM OF ELEC: 275.2 MOSMOL/KG (ref 275–300)
PLATELET # BLD AUTO: 300 THOU/MM3 (ref 130–400)
PMV BLD AUTO: 8.7 FL (ref 9.4–12.4)
POTASSIUM SERPL-SCNC: 4.6 MEQ/L (ref 3.5–5.2)
PROT SERPL-MCNC: 7.5 G/DL (ref 6.1–8)
RBC # BLD AUTO: 5.52 MILL/MM3 (ref 4.7–6.1)
SARS-COV-2 RNA RESP QL NAA+PROBE: NOT DETECTED
SEGMENTED NEUTROPHILS ABSOLUTE COUNT: 5.5 THOU/MM3 (ref 1.8–7.7)
SODIUM SERPL-SCNC: 138 MEQ/L (ref 135–145)
WBC # BLD AUTO: 7.1 THOU/MM3 (ref 4.8–10.8)

## 2024-01-02 PROCEDURE — 36415 COLL VENOUS BLD VENIPUNCTURE: CPT

## 2024-01-02 PROCEDURE — 2580000003 HC RX 258: Performed by: STUDENT IN AN ORGANIZED HEALTH CARE EDUCATION/TRAINING PROGRAM

## 2024-01-02 PROCEDURE — 71046 X-RAY EXAM CHEST 2 VIEWS: CPT

## 2024-01-02 PROCEDURE — 6370000000 HC RX 637 (ALT 250 FOR IP): Performed by: STUDENT IN AN ORGANIZED HEALTH CARE EDUCATION/TRAINING PROGRAM

## 2024-01-02 PROCEDURE — 99284 EMERGENCY DEPT VISIT MOD MDM: CPT

## 2024-01-02 PROCEDURE — 85025 COMPLETE CBC W/AUTO DIFF WBC: CPT

## 2024-01-02 PROCEDURE — 80053 COMPREHEN METABOLIC PANEL: CPT

## 2024-01-02 PROCEDURE — 87636 SARSCOV2 & INF A&B AMP PRB: CPT

## 2024-01-02 PROCEDURE — 96360 HYDRATION IV INFUSION INIT: CPT

## 2024-01-02 RX ORDER — ACETAMINOPHEN 500 MG
1000 TABLET ORAL ONCE
Status: COMPLETED | OUTPATIENT
Start: 2024-01-02 | End: 2024-01-02

## 2024-01-02 RX ORDER — 0.9 % SODIUM CHLORIDE 0.9 %
1000 INTRAVENOUS SOLUTION INTRAVENOUS ONCE
Status: COMPLETED | OUTPATIENT
Start: 2024-01-02 | End: 2024-01-02

## 2024-01-02 RX ADMIN — SODIUM CHLORIDE 1000 ML: 9 INJECTION, SOLUTION INTRAVENOUS at 21:27

## 2024-01-02 RX ADMIN — ACETAMINOPHEN 1000 MG: 500 TABLET ORAL at 21:29

## 2024-01-02 ASSESSMENT — PAIN - FUNCTIONAL ASSESSMENT: PAIN_FUNCTIONAL_ASSESSMENT: NONE - DENIES PAIN

## 2024-01-03 NOTE — ED NOTES
Patient moved to room 23. Patient updated on POC including some test results. Patient was aware of many results via [employer]. All questions answered. Call light in reach. Family at bedside.

## 2024-01-03 NOTE — ED PROVIDER NOTES
[facility] EMERGENCY DEPT  EMERGENCY DEPARTMENT ENCOUNTER          Pt Name: Taye Martinez  MRN: [id number]  Birthdate 1995  Date of evaluation: 1/2/2024  Physician: Qamar Borrego DO      CHIEF COMPLAINT       Chief Complaint   Patient presents with    Cough    Diarrhea         HISTORY OF PRESENT ILLNESS    HPI  Taye Martinez is a 28 y.o. male who presents to the emergency department from home, by private vehicle for evaluation of cough and diarrhea.  Patient reports symptoms going on the last week.  Patient reports that they initially got better but they got worse over the last 48 hours.  Patient denies any fevers or chills.  Patient denies any chest pain or shortness of breath.  Patient reports he works for [email or web address].  The patient has no other acute complaints at this time.      REVIEW OF SYSTEMS   Review of Systems   All other systems reviewed and are negative.        PAST MEDICAL AND SURGICAL HISTORY   No past medical history on file.  Past Surgical History:   Procedure Laterality Date    CHOLECYSTECTOMY, LAPAROSCOPIC N/A 7/19/2020    CHOLECYSTECTOMY LAPAROSCOPIC, cholangiogram performed by Yuriy Nicolas MD at [facility] OR         MEDICATIONS     Current Facility-Administered Medications:     sodium chloride 0.9 % bolus 1,000 mL, 1,000 mL, IntraVENous, Once, Qamar Borrego DO, Last Rate: 495.9 mL/hr at 01/02/24 2127, 1,000 mL at 01/02/24 2127    Current Outpatient Medications:     azelastine (ASTELIN) 0.1 % nasal spray, 1 spray by Nasal route 2 times daily Use in each nostril as directed, Disp: 30 mL, Rfl: 0    ibuprofen (ADVIL;MOTRIN) 800 MG tablet, Take 1 tablet by mouth every 6 hours as needed for Pain, Disp: 28 tablet, Rfl: 0    Previous Medications    AZELASTINE (ASTELIN) 0.1 % NASAL SPRAY    1 spray by Nasal route 2 times daily Use in each nostril as directed    IBUPROFEN (ADVIL;MOTRIN) 800 MG TABLET    Take 1 tablet by mouth every 6 hours as needed for Pain         SOCIAL

## 2024-01-03 NOTE — ED TRIAGE NOTES
Patient presents to ED from home with report of a cough, trouble breathing, and diarrhea that began yesterday. Patient is A/O x4 on arrival and ambulatory. Patient states he was sick last week and now feels like it is coming back worse.

## 2024-01-03 NOTE — ED NOTES
Discharge instructions and follow up discussed with pt. Pt verbalized understanding and denied further questions. LDA removed. Pt discharged with all belongings.

## (undated) DEVICE — GLOVE ORANGE PI 7   MSG9070

## (undated) DEVICE — TROCAR: Brand: KII SHIELDED BLADED ACCESS SYSTEM

## (undated) DEVICE — INTRODUCER CATH L3.5IN DIA7.5FR FOR CHOLANGIOGRAPHY TAUT

## (undated) DEVICE — 4-PORT MANIFOLD: Brand: NEPTUNE 2

## (undated) DEVICE — TROCAR: Brand: KII FIOS FIRST ENTRY

## (undated) DEVICE — TROCAR: Brand: KII® SLEEVE

## (undated) DEVICE — DRAPE C ARM W36XL30IN RECTANG BND BG AND TAPE

## (undated) DEVICE — APPLICATOR ENDOSCP L34CM W/ S STL CANN PLAS OBT STYL FOR

## (undated) DEVICE — PAD,EYE,1-5/8X2 5/8,STERILE,LF,1/PK: Brand: MEDLINE

## (undated) DEVICE — GOWN,SIRUS,NON REINFRCD,LARGE,SET IN SL: Brand: MEDLINE

## (undated) DEVICE — BAG SPEC REM 224ML W4XL6IN DIA10MM 1 HND GYN DISP ENDOPCH

## (undated) DEVICE — Z DUPLICATE USE 2431315 SET INSUF TBNG HI FLO W/ SMK EVAC FOR PNEUMOCLEAR

## (undated) DEVICE — BLADE CLIPPER GEN PURP NS

## (undated) DEVICE — TUBING, SUCTION, 1/4" X 20', STRAIGHT: Brand: MEDLINE INDUSTRIES, INC.

## (undated) DEVICE — BREAST HERNIA PACK: Brand: MEDLINE INDUSTRIES, INC.

## (undated) DEVICE — BANDAGE ADH W1XL3IN NAT FAB WVN FLX DURABLE N ADH PD SEAL

## (undated) DEVICE — ELECTRODE PT RET AD L9FT HI MOIST COND ADH HYDRGEL CORDED

## (undated) DEVICE — APPLIER CLP L SHFT DIA12MM 20 ROT MULT LIGACLP

## (undated) DEVICE — BANDAGE ADH W2XL4IN NITRL FAB STRP CURAD

## (undated) DEVICE — SUTURE VCRL SZ 4-0 L27IN ABSRB UD L19MM FS-2 3/8 CIR REV J422H

## (undated) DEVICE — SOLUTION ANTIFOG VIS SYS CLEARIFY LAPSCP

## (undated) DEVICE — COVER ARMBRD W13XL28.5IN IMPERV BLU FOR OP RM

## (undated) DEVICE — YANKAUER,BULB TIP,W/O VENT,RIGID,STERILE: Brand: MEDLINE

## (undated) DEVICE — GLOVE SURG SZ 6 THK91MIL LTX FREE SYN POLYISOPRENE ANTI

## (undated) DEVICE — PUMP SUC IRR TBNG L10FT W/ HNDPC ASSEMB STRYKEFLOW 2

## (undated) DEVICE — APPLIER CLP M L L11.4IN DIA10MM ENDOSCP ROT MULT FOR LIG

## (undated) DEVICE — CATHETER CHOLGM 4.5FR L18IN TIP 5.5FR W/ MTL SUPP TB TAUT

## (undated) DEVICE — UNIVERSAL MONOPOLAR LAPAROSCOPIC CABLE 10FT, 4MM PIN CONNECTOR: Brand: CONMED